# Patient Record
Sex: MALE | Race: BLACK OR AFRICAN AMERICAN | NOT HISPANIC OR LATINO | Employment: FULL TIME | URBAN - METROPOLITAN AREA
[De-identification: names, ages, dates, MRNs, and addresses within clinical notes are randomized per-mention and may not be internally consistent; named-entity substitution may affect disease eponyms.]

---

## 2022-01-01 ENCOUNTER — MYC MEDICAL ADVICE (OUTPATIENT)
Dept: CARDIOLOGY | Facility: CLINIC | Age: 47
End: 2022-01-01

## 2022-01-01 ENCOUNTER — LAB (OUTPATIENT)
Dept: LAB | Facility: CLINIC | Age: 47
End: 2022-01-01
Payer: COMMERCIAL

## 2022-01-01 ENCOUNTER — APPOINTMENT (OUTPATIENT)
Dept: CARDIOLOGY | Facility: CLINIC | Age: 47
DRG: 280 | End: 2022-01-01
Attending: PHYSICIAN ASSISTANT
Payer: COMMERCIAL

## 2022-01-01 ENCOUNTER — TELEPHONE (OUTPATIENT)
Dept: CARDIOLOGY | Facility: CLINIC | Age: 47
End: 2022-01-01

## 2022-01-01 ENCOUNTER — APPOINTMENT (OUTPATIENT)
Dept: PHYSICAL THERAPY | Facility: CLINIC | Age: 47
DRG: 280 | End: 2022-01-01
Attending: HOSPITALIST
Payer: COMMERCIAL

## 2022-01-01 ENCOUNTER — HOSPITAL ENCOUNTER (INPATIENT)
Facility: CLINIC | Age: 47
LOS: 6 days | Discharge: ANOTHER HOSPITAL | DRG: 280 | End: 2022-10-27
Attending: EMERGENCY MEDICINE | Admitting: INTERNAL MEDICINE
Payer: COMMERCIAL

## 2022-01-01 ENCOUNTER — PATIENT OUTREACH (OUTPATIENT)
Dept: CARE COORDINATION | Facility: CLINIC | Age: 47
End: 2022-01-01

## 2022-01-01 ENCOUNTER — OFFICE VISIT (OUTPATIENT)
Dept: CARDIOLOGY | Facility: CLINIC | Age: 47
End: 2022-01-01
Payer: COMMERCIAL

## 2022-01-01 ENCOUNTER — APPOINTMENT (OUTPATIENT)
Dept: CARDIOLOGY | Facility: CLINIC | Age: 47
DRG: 280 | End: 2022-01-01
Attending: INTERNAL MEDICINE
Payer: COMMERCIAL

## 2022-01-01 ENCOUNTER — OFFICE VISIT (OUTPATIENT)
Dept: CARDIOLOGY | Facility: CLINIC | Age: 47
End: 2022-01-01
Attending: NURSE PRACTITIONER
Payer: COMMERCIAL

## 2022-01-01 ENCOUNTER — TELEPHONE (OUTPATIENT)
Dept: INTERNAL MEDICINE | Facility: CLINIC | Age: 47
End: 2022-01-01

## 2022-01-01 ENCOUNTER — APPOINTMENT (OUTPATIENT)
Dept: PHYSICAL THERAPY | Facility: CLINIC | Age: 47
DRG: 280 | End: 2022-01-01
Attending: PHYSICIAN ASSISTANT
Payer: COMMERCIAL

## 2022-01-01 ENCOUNTER — HOSPITAL ENCOUNTER (INPATIENT)
Facility: CLINIC | Age: 47
LOS: 3 days | Discharge: HOME | DRG: 280 | End: 2022-10-30
Attending: HOSPITALIST | Admitting: INTERNAL MEDICINE
Payer: COMMERCIAL

## 2022-01-01 ENCOUNTER — APPOINTMENT (OUTPATIENT)
Dept: GENERAL RADIOLOGY | Facility: CLINIC | Age: 47
DRG: 280 | End: 2022-01-01
Attending: EMERGENCY MEDICINE
Payer: COMMERCIAL

## 2022-01-01 ENCOUNTER — APPOINTMENT (OUTPATIENT)
Dept: CARDIOLOGY | Facility: CLINIC | Age: 47
DRG: 280 | End: 2022-01-01
Attending: HOSPITALIST
Payer: COMMERCIAL

## 2022-01-01 VITALS
HEIGHT: 75 IN | HEART RATE: 86 BPM | SYSTOLIC BLOOD PRESSURE: 107 MMHG | DIASTOLIC BLOOD PRESSURE: 73 MMHG | BODY MASS INDEX: 22.29 KG/M2 | OXYGEN SATURATION: 99 % | WEIGHT: 179.3 LBS

## 2022-01-01 VITALS
HEART RATE: 84 BPM | SYSTOLIC BLOOD PRESSURE: 104 MMHG | DIASTOLIC BLOOD PRESSURE: 88 MMHG | RESPIRATION RATE: 16 BRPM | BODY MASS INDEX: 19.96 KG/M2 | WEIGHT: 159.7 LBS | OXYGEN SATURATION: 99 % | TEMPERATURE: 98.5 F

## 2022-01-01 VITALS
OXYGEN SATURATION: 99 % | HEIGHT: 75 IN | HEART RATE: 74 BPM | BODY MASS INDEX: 22.23 KG/M2 | SYSTOLIC BLOOD PRESSURE: 106 MMHG | DIASTOLIC BLOOD PRESSURE: 68 MMHG | WEIGHT: 178.8 LBS

## 2022-01-01 VITALS
OXYGEN SATURATION: 95 % | SYSTOLIC BLOOD PRESSURE: 110 MMHG | DIASTOLIC BLOOD PRESSURE: 89 MMHG | BODY MASS INDEX: 21.34 KG/M2 | TEMPERATURE: 97.7 F | HEART RATE: 81 BPM | HEIGHT: 75 IN | WEIGHT: 171.6 LBS | RESPIRATION RATE: 18 BRPM

## 2022-01-01 DIAGNOSIS — I50.21 ACUTE SYSTOLIC HEART FAILURE (H): ICD-10-CM

## 2022-01-01 DIAGNOSIS — I42.0 DILATED CARDIOMYOPATHY (H): Primary | ICD-10-CM

## 2022-01-01 DIAGNOSIS — I21.4 NSTEMI (NON-ST ELEVATED MYOCARDIAL INFARCTION) (H): ICD-10-CM

## 2022-01-01 DIAGNOSIS — I50.22 CHRONIC HFREF (HEART FAILURE WITH REDUCED EJECTION FRACTION) (H): Primary | ICD-10-CM

## 2022-01-01 DIAGNOSIS — I42.9 CARDIOMYOPATHY (H): Primary | ICD-10-CM

## 2022-01-01 DIAGNOSIS — I47.29 NSVT (NONSUSTAINED VENTRICULAR TACHYCARDIA) (H): ICD-10-CM

## 2022-01-01 DIAGNOSIS — R06.02 SHORTNESS OF BREATH: ICD-10-CM

## 2022-01-01 DIAGNOSIS — I42.9 CARDIOMYOPATHY (H): ICD-10-CM

## 2022-01-01 DIAGNOSIS — R00.2 PALPITATIONS: ICD-10-CM

## 2022-01-01 DIAGNOSIS — I50.22 CHRONIC HFREF (HEART FAILURE WITH REDUCED EJECTION FRACTION) (H): ICD-10-CM

## 2022-01-01 DIAGNOSIS — I51.3 LV (LEFT VENTRICULAR) MURAL THROMBUS: ICD-10-CM

## 2022-01-01 DIAGNOSIS — I42.0 DILATED CARDIOMYOPATHY (H): ICD-10-CM

## 2022-01-01 DIAGNOSIS — I51.3 RV (RIGHT VENTRICULAR) MURAL THROMBUS: ICD-10-CM

## 2022-01-01 DIAGNOSIS — I42.9 CARDIOMYOPATHY, UNSPECIFIED TYPE (H): Primary | ICD-10-CM

## 2022-01-01 DIAGNOSIS — I34.0 MITRAL VALVE INSUFFICIENCY, UNSPECIFIED ETIOLOGY: ICD-10-CM

## 2022-01-01 LAB
ALBUMIN SERPL-MCNC: 2.6 G/DL (ref 3.4–5)
ALP SERPL-CCNC: 88 U/L (ref 40–150)
ALT SERPL W P-5'-P-CCNC: 66 U/L (ref 0–70)
ANION GAP SERPL CALCULATED.3IONS-SCNC: 10 MMOL/L (ref 7–15)
ANION GAP SERPL CALCULATED.3IONS-SCNC: 10 MMOL/L (ref 7–15)
ANION GAP SERPL CALCULATED.3IONS-SCNC: 11 MMOL/L (ref 7–15)
ANION GAP SERPL CALCULATED.3IONS-SCNC: 13 MMOL/L (ref 7–15)
ANION GAP SERPL CALCULATED.3IONS-SCNC: 5 MMOL/L (ref 3–14)
ANION GAP SERPL CALCULATED.3IONS-SCNC: 8 MMOL/L (ref 7–15)
ANION GAP SERPL CALCULATED.3IONS-SCNC: 9 MMOL/L (ref 3–14)
ANION GAP SERPL CALCULATED.3IONS-SCNC: 9 MMOL/L (ref 7–15)
APTT PPP: 30 SECONDS (ref 22–38)
AST SERPL W P-5'-P-CCNC: 33 U/L (ref 0–45)
ATRIAL RATE - MUSE: 150 BPM
ATRIAL RATE - MUSE: 77 BPM
ATRIAL RATE - MUSE: 82 BPM
ATRIAL RATE - MUSE: 85 BPM
ATRIAL RATE - MUSE: 96 BPM
ATRIAL RATE - MUSE: 97 BPM
BASOPHILS # BLD AUTO: 0.1 10E3/UL (ref 0–0.2)
BASOPHILS NFR BLD AUTO: 1 %
BILIRUB SERPL-MCNC: 1 MG/DL (ref 0.2–1.3)
BUN SERPL-MCNC: 10.8 MG/DL (ref 6–20)
BUN SERPL-MCNC: 13.6 MG/DL (ref 6–20)
BUN SERPL-MCNC: 14.3 MG/DL (ref 6–20)
BUN SERPL-MCNC: 16.6 MG/DL (ref 6–20)
BUN SERPL-MCNC: 17.2 MG/DL (ref 6–20)
BUN SERPL-MCNC: 18 MG/DL (ref 7–30)
BUN SERPL-MCNC: 18 MG/DL (ref 7–30)
BUN SERPL-MCNC: 20.5 MG/DL (ref 6–20)
CALCIUM SERPL-MCNC: 8.6 MG/DL (ref 8.6–10)
CALCIUM SERPL-MCNC: 8.7 MG/DL (ref 8.5–10.1)
CALCIUM SERPL-MCNC: 8.7 MG/DL (ref 8.6–10)
CALCIUM SERPL-MCNC: 8.7 MG/DL (ref 8.6–10)
CALCIUM SERPL-MCNC: 8.9 MG/DL (ref 8.5–10.1)
CALCIUM SERPL-MCNC: 8.9 MG/DL (ref 8.6–10)
CALCIUM SERPL-MCNC: 9.2 MG/DL (ref 8.6–10)
CALCIUM SERPL-MCNC: 9.2 MG/DL (ref 8.6–10)
CHLORIDE BLD-SCNC: 106 MMOL/L (ref 94–109)
CHLORIDE BLD-SCNC: 107 MMOL/L (ref 94–109)
CHLORIDE SERPL-SCNC: 103 MMOL/L (ref 98–107)
CHLORIDE SERPL-SCNC: 104 MMOL/L (ref 98–107)
CHLORIDE SERPL-SCNC: 105 MMOL/L (ref 98–107)
CHLORIDE SERPL-SCNC: 106 MMOL/L (ref 98–107)
CHLORIDE SERPL-SCNC: 107 MMOL/L (ref 98–107)
CHLORIDE SERPL-SCNC: 108 MMOL/L (ref 98–107)
CHOLEST SERPL-MCNC: 126 MG/DL
CK SERPL-CCNC: 164 U/L (ref 39–308)
CO2 SERPL-SCNC: 23 MMOL/L (ref 20–32)
CO2 SERPL-SCNC: 26 MMOL/L (ref 20–32)
CREAT SERPL-MCNC: 1.18 MG/DL (ref 0.67–1.17)
CREAT SERPL-MCNC: 1.22 MG/DL (ref 0.67–1.17)
CREAT SERPL-MCNC: 1.23 MG/DL (ref 0.67–1.17)
CREAT SERPL-MCNC: 1.26 MG/DL (ref 0.66–1.25)
CREAT SERPL-MCNC: 1.26 MG/DL (ref 0.66–1.25)
CREAT SERPL-MCNC: 1.3 MG/DL (ref 0.66–1.25)
CREAT SERPL-MCNC: 1.31 MG/DL (ref 0.67–1.17)
CREAT SERPL-MCNC: 1.35 MG/DL (ref 0.67–1.17)
CREAT SERPL-MCNC: 1.36 MG/DL (ref 0.67–1.17)
D DIMER PPP FEU-MCNC: 0.56 UG/ML FEU (ref 0–0.5)
DEPRECATED HCO3 PLAS-SCNC: 23 MMOL/L (ref 22–29)
DEPRECATED HCO3 PLAS-SCNC: 23 MMOL/L (ref 22–29)
DEPRECATED HCO3 PLAS-SCNC: 24 MMOL/L (ref 22–29)
DIASTOLIC BLOOD PRESSURE - MUSE: NORMAL MMHG
EOSINOPHIL # BLD AUTO: 0.1 10E3/UL (ref 0–0.7)
EOSINOPHIL NFR BLD AUTO: 2 %
ERYTHROCYTE [DISTWIDTH] IN BLOOD BY AUTOMATED COUNT: 13.7 % (ref 10–15)
ERYTHROCYTE [DISTWIDTH] IN BLOOD BY AUTOMATED COUNT: 13.7 % (ref 10–15)
ERYTHROCYTE [DISTWIDTH] IN BLOOD BY AUTOMATED COUNT: 14.2 % (ref 10–15)
ERYTHROCYTE [DISTWIDTH] IN BLOOD BY AUTOMATED COUNT: 14.3 % (ref 10–15)
ERYTHROCYTE [DISTWIDTH] IN BLOOD BY AUTOMATED COUNT: 14.5 % (ref 10–15)
ERYTHROCYTE [DISTWIDTH] IN BLOOD BY AUTOMATED COUNT: 14.5 % (ref 10–15)
ERYTHROCYTE [DISTWIDTH] IN BLOOD BY AUTOMATED COUNT: 14.6 % (ref 10–15)
FERRITIN SERPL-MCNC: 22 NG/ML (ref 31–409)
FLUAV RNA SPEC QL NAA+PROBE: NEGATIVE
FLUBV RNA RESP QL NAA+PROBE: NEGATIVE
GFR SERPL CREATININE-BSD FRML MDRD: 64 ML/MIN/1.73M2
GFR SERPL CREATININE-BSD FRML MDRD: 64 ML/MIN/1.73M2
GFR SERPL CREATININE-BSD FRML MDRD: 67 ML/MIN/1.73M2
GFR SERPL CREATININE-BSD FRML MDRD: 67 ML/MIN/1.73M2
GFR SERPL CREATININE-BSD FRML MDRD: 70 ML/MIN/1.73M2
GFR SERPL CREATININE-BSD FRML MDRD: 70 ML/MIN/1.73M2
GFR SERPL CREATININE-BSD FRML MDRD: 72 ML/MIN/1.73M2
GFR SERPL CREATININE-BSD FRML MDRD: 73 ML/MIN/1.73M2
GFR SERPL CREATININE-BSD FRML MDRD: 76 ML/MIN/1.73M2
GLUCOSE BLD-MCNC: 83 MG/DL (ref 70–99)
GLUCOSE BLD-MCNC: 98 MG/DL (ref 70–99)
GLUCOSE BLDC GLUCOMTR-MCNC: 82 MG/DL (ref 70–99)
GLUCOSE BLDC GLUCOMTR-MCNC: 89 MG/DL (ref 70–99)
GLUCOSE BLDC GLUCOMTR-MCNC: 97 MG/DL (ref 70–99)
GLUCOSE SERPL-MCNC: 101 MG/DL (ref 70–99)
GLUCOSE SERPL-MCNC: 103 MG/DL (ref 70–99)
GLUCOSE SERPL-MCNC: 119 MG/DL (ref 70–99)
GLUCOSE SERPL-MCNC: 79 MG/DL (ref 70–99)
GLUCOSE SERPL-MCNC: 91 MG/DL (ref 70–99)
GLUCOSE SERPL-MCNC: 92 MG/DL (ref 70–99)
HBA1C MFR BLD: 5.5 %
HCT VFR BLD AUTO: 39.2 % (ref 40–53)
HCT VFR BLD AUTO: 40.4 % (ref 40–53)
HCT VFR BLD AUTO: 40.5 % (ref 40–53)
HCT VFR BLD AUTO: 41.1 % (ref 40–53)
HCT VFR BLD AUTO: 42.1 % (ref 40–53)
HCT VFR BLD AUTO: 42.4 % (ref 40–53)
HCT VFR BLD AUTO: 43.7 % (ref 40–53)
HDLC SERPL-MCNC: 31 MG/DL
HGB BLD-MCNC: 12.7 G/DL (ref 13.3–17.7)
HGB BLD-MCNC: 12.7 G/DL (ref 13.3–17.7)
HGB BLD-MCNC: 12.8 G/DL (ref 13.3–17.7)
HGB BLD-MCNC: 13.1 G/DL (ref 13.3–17.7)
HGB BLD-MCNC: 13.4 G/DL (ref 13.3–17.7)
HGB BLD-MCNC: 13.5 G/DL (ref 13.3–17.7)
HGB BLD-MCNC: 13.5 G/DL (ref 13.3–17.7)
HOLD SPECIMEN: NORMAL
IMM GRANULOCYTES # BLD: 0 10E3/UL
IMM GRANULOCYTES NFR BLD: 0 %
INR PPP: 1.16 (ref 0.85–1.15)
INTERPRETATION ECG - MUSE: NORMAL
LDLC SERPL CALC-MCNC: 77 MG/DL
LVEF ECHO: NORMAL
LYMPHOCYTES # BLD AUTO: 2 10E3/UL (ref 0.8–5.3)
LYMPHOCYTES NFR BLD AUTO: 37 %
MAGNESIUM SERPL-MCNC: 1.5 MG/DL (ref 1.7–2.3)
MAGNESIUM SERPL-MCNC: 1.6 MG/DL (ref 1.7–2.3)
MAGNESIUM SERPL-MCNC: 1.8 MG/DL (ref 1.7–2.3)
MAGNESIUM SERPL-MCNC: 1.8 MG/DL (ref 1.7–2.3)
MAGNESIUM SERPL-MCNC: 2.1 MG/DL (ref 1.6–2.3)
MCH RBC QN AUTO: 29.3 PG (ref 26.5–33)
MCH RBC QN AUTO: 29.7 PG (ref 26.5–33)
MCH RBC QN AUTO: 29.8 PG (ref 26.5–33)
MCH RBC QN AUTO: 29.8 PG (ref 26.5–33)
MCH RBC QN AUTO: 29.9 PG (ref 26.5–33)
MCHC RBC AUTO-ENTMCNC: 30.9 G/DL (ref 31.5–36.5)
MCHC RBC AUTO-ENTMCNC: 31.1 G/DL (ref 31.5–36.5)
MCHC RBC AUTO-ENTMCNC: 31.1 G/DL (ref 31.5–36.5)
MCHC RBC AUTO-ENTMCNC: 31.4 G/DL (ref 31.5–36.5)
MCHC RBC AUTO-ENTMCNC: 31.8 G/DL (ref 31.5–36.5)
MCHC RBC AUTO-ENTMCNC: 32.4 G/DL (ref 31.5–36.5)
MCHC RBC AUTO-ENTMCNC: 33.2 G/DL (ref 31.5–36.5)
MCV RBC AUTO: 90 FL (ref 78–100)
MCV RBC AUTO: 92 FL (ref 78–100)
MCV RBC AUTO: 93 FL (ref 78–100)
MCV RBC AUTO: 94 FL (ref 78–100)
MCV RBC AUTO: 95 FL (ref 78–100)
MCV RBC AUTO: 96 FL (ref 78–100)
MCV RBC AUTO: 97 FL (ref 78–100)
MONOCYTES # BLD AUTO: 0.6 10E3/UL (ref 0–1.3)
MONOCYTES NFR BLD AUTO: 11 %
NEUTROPHILS # BLD AUTO: 2.6 10E3/UL (ref 1.6–8.3)
NEUTROPHILS NFR BLD AUTO: 49 %
NONHDLC SERPL-MCNC: 95 MG/DL
NRBC # BLD AUTO: 0 10E3/UL
NRBC BLD AUTO-RTO: 0 /100
NT-PROBNP SERPL-MCNC: 2258 PG/ML (ref 0–450)
NT-PROBNP SERPL-MCNC: 2732 PG/ML (ref 0–450)
P AXIS - MUSE: 45 DEGREES
P AXIS - MUSE: 54 DEGREES
P AXIS - MUSE: 57 DEGREES
P AXIS - MUSE: 64 DEGREES
P AXIS - MUSE: 72 DEGREES
P AXIS - MUSE: NORMAL DEGREES
PHOSPHATE SERPL-MCNC: 3.5 MG/DL (ref 2.5–4.5)
PLATELET # BLD AUTO: 364 10E3/UL (ref 150–450)
PLATELET # BLD AUTO: 365 10E3/UL (ref 150–450)
PLATELET # BLD AUTO: 402 10E3/UL (ref 150–450)
PLATELET # BLD AUTO: 411 10E3/UL (ref 150–450)
PLATELET # BLD AUTO: 461 10E3/UL (ref 150–450)
PLATELET # BLD AUTO: 483 10E3/UL (ref 150–450)
PLATELET # BLD AUTO: 509 10E3/UL (ref 150–450)
POTASSIUM BLD-SCNC: 4.2 MMOL/L (ref 3.4–5.3)
POTASSIUM BLD-SCNC: 4.3 MMOL/L (ref 3.4–5.3)
POTASSIUM SERPL-SCNC: 3.8 MMOL/L (ref 3.4–5.3)
POTASSIUM SERPL-SCNC: 3.9 MMOL/L (ref 3.4–5.3)
POTASSIUM SERPL-SCNC: 3.9 MMOL/L (ref 3.4–5.3)
POTASSIUM SERPL-SCNC: 4 MMOL/L (ref 3.4–5.3)
POTASSIUM SERPL-SCNC: 4.1 MMOL/L (ref 3.4–5.3)
POTASSIUM SERPL-SCNC: 4.1 MMOL/L (ref 3.4–5.3)
POTASSIUM SERPL-SCNC: 4.5 MMOL/L (ref 3.4–5.3)
POTASSIUM SERPL-SCNC: 4.6 MMOL/L (ref 3.4–5.3)
POTASSIUM SERPL-SCNC: 4.8 MMOL/L (ref 3.4–5.3)
PR INTERVAL - MUSE: 152 MS
PR INTERVAL - MUSE: 154 MS
PR INTERVAL - MUSE: 154 MS
PR INTERVAL - MUSE: 158 MS
PR INTERVAL - MUSE: 160 MS
PR INTERVAL - MUSE: NORMAL MS
PROCALCITONIN SERPL IA-MCNC: 0.06 NG/ML
PROT SERPL-MCNC: 6.1 G/DL (ref 6.8–8.8)
QRS DURATION - MUSE: 86 MS
QRS DURATION - MUSE: 88 MS
QT - MUSE: 322 MS
QT - MUSE: 368 MS
QT - MUSE: 384 MS
QT - MUSE: 406 MS
QT - MUSE: 426 MS
QT - MUSE: 464 MS
QTC - MUSE: 467 MS
QTC - MUSE: 474 MS
QTC - MUSE: 485 MS
QTC - MUSE: 506 MS
QTC - MUSE: 510 MS
QTC - MUSE: 525 MS
R AXIS - MUSE: -56 DEGREES
R AXIS - MUSE: -68 DEGREES
R AXIS - MUSE: -69 DEGREES
R AXIS - MUSE: -70 DEGREES
R AXIS - MUSE: 260 DEGREES
R AXIS - MUSE: 270 DEGREES
RBC # BLD AUTO: 4.28 10E6/UL (ref 4.4–5.9)
RBC # BLD AUTO: 4.31 10E6/UL (ref 4.4–5.9)
RBC # BLD AUTO: 4.33 10E6/UL (ref 4.4–5.9)
RBC # BLD AUTO: 4.39 10E6/UL (ref 4.4–5.9)
RBC # BLD AUTO: 4.48 10E6/UL (ref 4.4–5.9)
RBC # BLD AUTO: 4.53 10E6/UL (ref 4.4–5.9)
RBC # BLD AUTO: 4.55 10E6/UL (ref 4.4–5.9)
RSV RNA SPEC NAA+PROBE: NEGATIVE
SARS-COV-2 RNA RESP QL NAA+PROBE: POSITIVE
SODIUM SERPL-SCNC: 137 MMOL/L (ref 133–144)
SODIUM SERPL-SCNC: 138 MMOL/L (ref 136–145)
SODIUM SERPL-SCNC: 139 MMOL/L (ref 133–144)
SODIUM SERPL-SCNC: 139 MMOL/L (ref 136–145)
SODIUM SERPL-SCNC: 139 MMOL/L (ref 136–145)
SODIUM SERPL-SCNC: 140 MMOL/L (ref 136–145)
SYSTOLIC BLOOD PRESSURE - MUSE: NORMAL MMHG
T AXIS - MUSE: 168 DEGREES
T AXIS - MUSE: 190 DEGREES
T AXIS - MUSE: 266 DEGREES
T AXIS - MUSE: 268 DEGREES
T AXIS - MUSE: 96 DEGREES
T AXIS - MUSE: 99 DEGREES
T4 FREE SERPL-MCNC: 0.95 NG/DL (ref 0.9–1.7)
TRIGL SERPL-MCNC: 89 MG/DL
TROPONIN T SERPL HS-MCNC: 122 NG/L
TROPONIN T SERPL HS-MCNC: 128 NG/L
TROPONIN T SERPL HS-MCNC: 142 NG/L
TROPONIN T SERPL HS-MCNC: 177 NG/L
TSH SERPL DL<=0.005 MIU/L-ACNC: 5.75 UIU/ML (ref 0.3–4.2)
UFH PPP CHRO-ACNC: 0.14 IU/ML
UFH PPP CHRO-ACNC: 0.15 IU/ML
UFH PPP CHRO-ACNC: 0.21 IU/ML
UFH PPP CHRO-ACNC: 0.25 IU/ML
UFH PPP CHRO-ACNC: 0.28 IU/ML
UFH PPP CHRO-ACNC: 0.29 IU/ML
UFH PPP CHRO-ACNC: 0.33 IU/ML
UFH PPP CHRO-ACNC: 0.33 IU/ML
UFH PPP CHRO-ACNC: 0.34 IU/ML
UFH PPP CHRO-ACNC: 0.35 IU/ML
UFH PPP CHRO-ACNC: 0.39 IU/ML
UFH PPP CHRO-ACNC: 0.4 IU/ML
UFH PPP CHRO-ACNC: 0.59 IU/ML
VENTRICULAR RATE- MUSE: 151 BPM
VENTRICULAR RATE- MUSE: 77 BPM
VENTRICULAR RATE- MUSE: 82 BPM
VENTRICULAR RATE- MUSE: 85 BPM
VENTRICULAR RATE- MUSE: 96 BPM
VENTRICULAR RATE- MUSE: 97 BPM
WBC # BLD AUTO: 3.8 10E3/UL (ref 4–11)
WBC # BLD AUTO: 3.9 10E3/UL (ref 4–11)
WBC # BLD AUTO: 4.2 10E3/UL (ref 4–11)
WBC # BLD AUTO: 4.5 10E3/UL (ref 4–11)
WBC # BLD AUTO: 5.3 10E3/UL (ref 4–11)
WBC # BLD AUTO: 5.3 10E3/UL (ref 4–11)
WBC # BLD AUTO: 5.4 10E3/UL (ref 4–11)

## 2022-01-01 PROCEDURE — 250N000011 HC RX IP 250 OP 636: Performed by: PHYSICIAN ASSISTANT

## 2022-01-01 PROCEDURE — 250N000011 HC RX IP 250 OP 636: Performed by: INTERNAL MEDICINE

## 2022-01-01 PROCEDURE — C1769 GUIDE WIRE: HCPCS | Performed by: INTERNAL MEDICINE

## 2022-01-01 PROCEDURE — 80048 BASIC METABOLIC PNL TOTAL CA: CPT | Performed by: EMERGENCY MEDICINE

## 2022-01-01 PROCEDURE — 85520 HEPARIN ASSAY: CPT | Performed by: INTERNAL MEDICINE

## 2022-01-01 PROCEDURE — 93270 REMOTE 30 DAY ECG REV/REPORT: CPT

## 2022-01-01 PROCEDURE — 36415 COLL VENOUS BLD VENIPUNCTURE: CPT | Performed by: INTERNAL MEDICINE

## 2022-01-01 PROCEDURE — 84484 ASSAY OF TROPONIN QUANT: CPT | Performed by: EMERGENCY MEDICINE

## 2022-01-01 PROCEDURE — 250N000009 HC RX 250: Performed by: INTERNAL MEDICINE

## 2022-01-01 PROCEDURE — 250N000013 HC RX MED GY IP 250 OP 250 PS 637: Performed by: PHYSICIAN ASSISTANT

## 2022-01-01 PROCEDURE — 83735 ASSAY OF MAGNESIUM: CPT | Performed by: INTERNAL MEDICINE

## 2022-01-01 PROCEDURE — 99233 SBSQ HOSP IP/OBS HIGH 50: CPT | Performed by: PHYSICIAN ASSISTANT

## 2022-01-01 PROCEDURE — 84439 ASSAY OF FREE THYROXINE: CPT | Performed by: NURSE PRACTITIONER

## 2022-01-01 PROCEDURE — 99232 SBSQ HOSP IP/OBS MODERATE 35: CPT | Performed by: NURSE PRACTITIONER

## 2022-01-01 PROCEDURE — 36415 COLL VENOUS BLD VENIPUNCTURE: CPT | Performed by: NURSE PRACTITIONER

## 2022-01-01 PROCEDURE — 84443 ASSAY THYROID STIM HORMONE: CPT | Performed by: NURSE PRACTITIONER

## 2022-01-01 PROCEDURE — 80053 COMPREHEN METABOLIC PANEL: CPT | Performed by: PHYSICIAN ASSISTANT

## 2022-01-01 PROCEDURE — 80048 BASIC METABOLIC PNL TOTAL CA: CPT | Performed by: HOSPITALIST

## 2022-01-01 PROCEDURE — 250N000013 HC RX MED GY IP 250 OP 250 PS 637: Performed by: INTERNAL MEDICINE

## 2022-01-01 PROCEDURE — 83036 HEMOGLOBIN GLYCOSYLATED A1C: CPT | Performed by: INTERNAL MEDICINE

## 2022-01-01 PROCEDURE — 210N000002 HC R&B HEART CARE

## 2022-01-01 PROCEDURE — 85027 COMPLETE CBC AUTOMATED: CPT | Performed by: PHYSICIAN ASSISTANT

## 2022-01-01 PROCEDURE — 120N000001 HC R&B MED SURG/OB

## 2022-01-01 PROCEDURE — 93005 ELECTROCARDIOGRAM TRACING: CPT

## 2022-01-01 PROCEDURE — 258N000003 HC RX IP 258 OP 636: Performed by: INTERNAL MEDICINE

## 2022-01-01 PROCEDURE — 99215 OFFICE O/P EST HI 40 MIN: CPT | Performed by: NURSE PRACTITIONER

## 2022-01-01 PROCEDURE — 85520 HEPARIN ASSAY: CPT | Performed by: HOSPITALIST

## 2022-01-01 PROCEDURE — 99223 1ST HOSP IP/OBS HIGH 75: CPT | Mod: AI | Performed by: INTERNAL MEDICINE

## 2022-01-01 PROCEDURE — 93454 CORONARY ARTERY ANGIO S&I: CPT | Mod: 26 | Performed by: INTERNAL MEDICINE

## 2022-01-01 PROCEDURE — 99152 MOD SED SAME PHYS/QHP 5/>YRS: CPT | Performed by: INTERNAL MEDICINE

## 2022-01-01 PROCEDURE — 99233 SBSQ HOSP IP/OBS HIGH 50: CPT | Performed by: INTERNAL MEDICINE

## 2022-01-01 PROCEDURE — 96376 TX/PRO/DX INJ SAME DRUG ADON: CPT

## 2022-01-01 PROCEDURE — 85027 COMPLETE CBC AUTOMATED: CPT | Performed by: INTERNAL MEDICINE

## 2022-01-01 PROCEDURE — 85610 PROTHROMBIN TIME: CPT | Performed by: EMERGENCY MEDICINE

## 2022-01-01 PROCEDURE — 80061 LIPID PANEL: CPT | Performed by: INTERNAL MEDICINE

## 2022-01-01 PROCEDURE — 84132 ASSAY OF SERUM POTASSIUM: CPT | Performed by: INTERNAL MEDICINE

## 2022-01-01 PROCEDURE — 99233 SBSQ HOSP IP/OBS HIGH 50: CPT | Mod: 25 | Performed by: INTERNAL MEDICINE

## 2022-01-01 PROCEDURE — 97110 THERAPEUTIC EXERCISES: CPT | Mod: GP

## 2022-01-01 PROCEDURE — 82565 ASSAY OF CREATININE: CPT | Performed by: PHYSICIAN ASSISTANT

## 2022-01-01 PROCEDURE — 93272 ECG/REVIEW INTERPRET ONLY: CPT | Performed by: INTERNAL MEDICINE

## 2022-01-01 PROCEDURE — 255N000002 HC RX 255 OP 636: Performed by: INTERNAL MEDICINE

## 2022-01-01 PROCEDURE — 36415 COLL VENOUS BLD VENIPUNCTURE: CPT | Performed by: EMERGENCY MEDICINE

## 2022-01-01 PROCEDURE — 85379 FIBRIN DEGRADATION QUANT: CPT | Performed by: INTERNAL MEDICINE

## 2022-01-01 PROCEDURE — 82040 ASSAY OF SERUM ALBUMIN: CPT | Performed by: PHYSICIAN ASSISTANT

## 2022-01-01 PROCEDURE — 87637 SARSCOV2&INF A&B&RSV AMP PRB: CPT | Performed by: EMERGENCY MEDICINE

## 2022-01-01 PROCEDURE — 80048 BASIC METABOLIC PNL TOTAL CA: CPT | Performed by: NURSE PRACTITIONER

## 2022-01-01 PROCEDURE — A9585 GADOBUTROL INJECTION: HCPCS | Performed by: INTERNAL MEDICINE

## 2022-01-01 PROCEDURE — 75561 CARDIAC MRI FOR MORPH W/DYE: CPT

## 2022-01-01 PROCEDURE — 36415 COLL VENOUS BLD VENIPUNCTURE: CPT | Performed by: HOSPITALIST

## 2022-01-01 PROCEDURE — 99214 OFFICE O/P EST MOD 30 MIN: CPT | Performed by: NURSE PRACTITIONER

## 2022-01-01 PROCEDURE — 93454 CORONARY ARTERY ANGIO S&I: CPT | Performed by: INTERNAL MEDICINE

## 2022-01-01 PROCEDURE — 99207 PR NO BILLABLE SERVICE THIS VISIT: CPT | Performed by: INTERNAL MEDICINE

## 2022-01-01 PROCEDURE — 99233 SBSQ HOSP IP/OBS HIGH 50: CPT | Performed by: HOSPITALIST

## 2022-01-01 PROCEDURE — 99232 SBSQ HOSP IP/OBS MODERATE 35: CPT | Mod: 24 | Performed by: PHYSICIAN ASSISTANT

## 2022-01-01 PROCEDURE — 99285 EMERGENCY DEPT VISIT HI MDM: CPT | Mod: 25

## 2022-01-01 PROCEDURE — 93010 ELECTROCARDIOGRAM REPORT: CPT | Performed by: INTERNAL MEDICINE

## 2022-01-01 PROCEDURE — 83880 ASSAY OF NATRIURETIC PEPTIDE: CPT | Performed by: NURSE PRACTITIONER

## 2022-01-01 PROCEDURE — 36415 COLL VENOUS BLD VENIPUNCTURE: CPT | Performed by: PHYSICIAN ASSISTANT

## 2022-01-01 PROCEDURE — 80048 BASIC METABOLIC PNL TOTAL CA: CPT | Performed by: INTERNAL MEDICINE

## 2022-01-01 PROCEDURE — 85025 COMPLETE CBC W/AUTO DIFF WBC: CPT | Performed by: EMERGENCY MEDICINE

## 2022-01-01 PROCEDURE — 250N000011 HC RX IP 250 OP 636: Performed by: EMERGENCY MEDICINE

## 2022-01-01 PROCEDURE — 82728 ASSAY OF FERRITIN: CPT | Performed by: NURSE PRACTITIONER

## 2022-01-01 PROCEDURE — 85027 COMPLETE CBC AUTOMATED: CPT | Performed by: EMERGENCY MEDICINE

## 2022-01-01 PROCEDURE — 97161 PT EVAL LOW COMPLEX 20 MIN: CPT | Mod: GP

## 2022-01-01 PROCEDURE — 82310 ASSAY OF CALCIUM: CPT | Performed by: INTERNAL MEDICINE

## 2022-01-01 PROCEDURE — 99207 PR SC NO CHARGE VISIT: CPT | Performed by: INTERNAL MEDICINE

## 2022-01-01 PROCEDURE — 99291 CRITICAL CARE FIRST HOUR: CPT | Performed by: INTERNAL MEDICINE

## 2022-01-01 PROCEDURE — 97530 THERAPEUTIC ACTIVITIES: CPT | Mod: GP

## 2022-01-01 PROCEDURE — 99232 SBSQ HOSP IP/OBS MODERATE 35: CPT | Performed by: PHYSICIAN ASSISTANT

## 2022-01-01 PROCEDURE — B2111ZZ FLUOROSCOPY OF MULTIPLE CORONARY ARTERIES USING LOW OSMOLAR CONTRAST: ICD-10-PCS | Performed by: INTERNAL MEDICINE

## 2022-01-01 PROCEDURE — 82550 ASSAY OF CK (CPK): CPT | Performed by: INTERNAL MEDICINE

## 2022-01-01 PROCEDURE — 250N000013 HC RX MED GY IP 250 OP 250 PS 637: Performed by: NURSE PRACTITIONER

## 2022-01-01 PROCEDURE — 84145 PROCALCITONIN (PCT): CPT | Performed by: EMERGENCY MEDICINE

## 2022-01-01 PROCEDURE — 83880 ASSAY OF NATRIURETIC PEPTIDE: CPT | Performed by: EMERGENCY MEDICINE

## 2022-01-01 PROCEDURE — 99232 SBSQ HOSP IP/OBS MODERATE 35: CPT | Performed by: INTERNAL MEDICINE

## 2022-01-01 PROCEDURE — 85027 COMPLETE CBC AUTOMATED: CPT | Performed by: HOSPITALIST

## 2022-01-01 PROCEDURE — 99239 HOSP IP/OBS DSCHRG MGMT >30: CPT | Performed by: HOSPITALIST

## 2022-01-01 PROCEDURE — 93306 TTE W/DOPPLER COMPLETE: CPT

## 2022-01-01 PROCEDURE — 83735 ASSAY OF MAGNESIUM: CPT | Performed by: PHYSICIAN ASSISTANT

## 2022-01-01 PROCEDURE — 93306 TTE W/DOPPLER COMPLETE: CPT | Mod: 26 | Performed by: INTERNAL MEDICINE

## 2022-01-01 PROCEDURE — 272N000001 HC OR GENERAL SUPPLY STERILE: Performed by: INTERNAL MEDICINE

## 2022-01-01 PROCEDURE — 84484 ASSAY OF TROPONIN QUANT: CPT | Performed by: INTERNAL MEDICINE

## 2022-01-01 PROCEDURE — 84100 ASSAY OF PHOSPHORUS: CPT | Performed by: PHYSICIAN ASSISTANT

## 2022-01-01 PROCEDURE — 96365 THER/PROPH/DIAG IV INF INIT: CPT

## 2022-01-01 PROCEDURE — 85730 THROMBOPLASTIN TIME PARTIAL: CPT | Performed by: EMERGENCY MEDICINE

## 2022-01-01 PROCEDURE — 99223 1ST HOSP IP/OBS HIGH 75: CPT | Mod: GT | Performed by: INTERNAL MEDICINE

## 2022-01-01 PROCEDURE — C9803 HOPD COVID-19 SPEC COLLECT: HCPCS

## 2022-01-01 PROCEDURE — 99207 PR APP CREDIT; MD BILLING SHARED VISIT: CPT | Performed by: PHYSICIAN ASSISTANT

## 2022-01-01 PROCEDURE — 75561 CARDIAC MRI FOR MORPH W/DYE: CPT | Mod: 26 | Performed by: INTERNAL MEDICINE

## 2022-01-01 PROCEDURE — 71046 X-RAY EXAM CHEST 2 VIEWS: CPT

## 2022-01-01 PROCEDURE — 99417 PROLNG OP E/M EACH 15 MIN: CPT | Performed by: NURSE PRACTITIONER

## 2022-01-01 PROCEDURE — 96366 THER/PROPH/DIAG IV INF ADDON: CPT

## 2022-01-01 PROCEDURE — C1894 INTRO/SHEATH, NON-LASER: HCPCS | Performed by: INTERNAL MEDICINE

## 2022-01-01 PROCEDURE — 99239 HOSP IP/OBS DSCHRG MGMT >30: CPT | Performed by: INTERNAL MEDICINE

## 2022-01-01 PROCEDURE — C1887 CATHETER, GUIDING: HCPCS | Performed by: INTERNAL MEDICINE

## 2022-01-01 RX ORDER — ASPIRIN 325 MG
325 TABLET ORAL ONCE
Status: COMPLETED | OUTPATIENT
Start: 2022-01-01 | End: 2022-01-01

## 2022-01-01 RX ORDER — LIDOCAINE 40 MG/G
CREAM TOPICAL
Status: DISCONTINUED | OUTPATIENT
Start: 2022-01-01 | End: 2022-01-01

## 2022-01-01 RX ORDER — ONDANSETRON 2 MG/ML
4 INJECTION INTRAMUSCULAR; INTRAVENOUS EVERY 6 HOURS PRN
Status: DISCONTINUED | OUTPATIENT
Start: 2022-01-01 | End: 2022-01-01 | Stop reason: HOSPADM

## 2022-01-01 RX ORDER — HEPARIN SODIUM 10000 [USP'U]/100ML
0-5000 INJECTION, SOLUTION INTRAVENOUS CONTINUOUS
Status: DISCONTINUED | OUTPATIENT
Start: 2022-01-01 | End: 2022-01-01

## 2022-01-01 RX ORDER — NALOXONE HYDROCHLORIDE 0.4 MG/ML
0.2 INJECTION, SOLUTION INTRAMUSCULAR; INTRAVENOUS; SUBCUTANEOUS
Status: DISCONTINUED | OUTPATIENT
Start: 2022-01-01 | End: 2022-01-01 | Stop reason: HOSPADM

## 2022-01-01 RX ORDER — SODIUM CHLORIDE 9 MG/ML
INJECTION, SOLUTION INTRAVENOUS CONTINUOUS
Status: DISCONTINUED | OUTPATIENT
Start: 2022-01-01 | End: 2022-01-01 | Stop reason: HOSPADM

## 2022-01-01 RX ORDER — METOPROLOL TARTRATE 25 MG/1
25 TABLET, FILM COATED ORAL 2 TIMES DAILY
Status: DISCONTINUED | OUTPATIENT
Start: 2022-01-01 | End: 2022-01-01

## 2022-01-01 RX ORDER — ATROPINE SULFATE 0.1 MG/ML
0.5 INJECTION INTRAVENOUS
Status: DISCONTINUED | OUTPATIENT
Start: 2022-01-01 | End: 2022-01-01 | Stop reason: HOSPADM

## 2022-01-01 RX ORDER — HYDROMORPHONE HCL IN WATER/PF 6 MG/30 ML
0.2 PATIENT CONTROLLED ANALGESIA SYRINGE INTRAVENOUS
Status: DISCONTINUED | OUTPATIENT
Start: 2022-01-01 | End: 2022-01-01 | Stop reason: HOSPADM

## 2022-01-01 RX ORDER — ACETAMINOPHEN 325 MG/1
650 TABLET ORAL EVERY 4 HOURS PRN
Status: DISCONTINUED | OUTPATIENT
Start: 2022-01-01 | End: 2022-01-01 | Stop reason: HOSPADM

## 2022-01-01 RX ORDER — NALOXONE HYDROCHLORIDE 0.4 MG/ML
0.4 INJECTION, SOLUTION INTRAMUSCULAR; INTRAVENOUS; SUBCUTANEOUS
Status: DISCONTINUED | OUTPATIENT
Start: 2022-01-01 | End: 2022-01-01

## 2022-01-01 RX ORDER — NITROGLYCERIN 5 MG/ML
VIAL (ML) INTRAVENOUS
Status: DISCONTINUED
Start: 2022-01-01 | End: 2022-01-01 | Stop reason: HOSPADM

## 2022-01-01 RX ORDER — SACUBITRIL AND VALSARTAN 49; 51 MG/1; MG/1
1 TABLET, FILM COATED ORAL 2 TIMES DAILY
Qty: 60 TABLET | Refills: 1 | Status: SHIPPED | OUTPATIENT
Start: 2022-01-01

## 2022-01-01 RX ORDER — LORAZEPAM 0.5 MG/1
0.5 TABLET ORAL
Status: DISCONTINUED | OUTPATIENT
Start: 2022-01-01 | End: 2022-01-01 | Stop reason: HOSPADM

## 2022-01-01 RX ORDER — METOPROLOL SUCCINATE 50 MG/1
50 TABLET, EXTENDED RELEASE ORAL 2 TIMES DAILY
Qty: 180 TABLET | Refills: 1 | Status: SHIPPED | OUTPATIENT
Start: 2022-01-01

## 2022-01-01 RX ORDER — OXYCODONE HYDROCHLORIDE 5 MG/1
5 TABLET ORAL EVERY 4 HOURS PRN
Status: DISCONTINUED | OUTPATIENT
Start: 2022-01-01 | End: 2022-01-01 | Stop reason: HOSPADM

## 2022-01-01 RX ORDER — FENTANYL CITRATE 50 UG/ML
INJECTION, SOLUTION INTRAMUSCULAR; INTRAVENOUS
Status: DISCONTINUED
Start: 2022-01-01 | End: 2022-01-01 | Stop reason: HOSPADM

## 2022-01-01 RX ORDER — NITROGLYCERIN 5 MG/ML
VIAL (ML) INTRAVENOUS
Status: COMPLETED | OUTPATIENT
Start: 2022-01-01 | End: 2022-01-01

## 2022-01-01 RX ORDER — GADOBUTROL 604.72 MG/ML
10 INJECTION INTRAVENOUS ONCE
Status: COMPLETED | OUTPATIENT
Start: 2022-01-01 | End: 2022-01-01

## 2022-01-01 RX ORDER — ACETAMINOPHEN 325 MG/1
650 TABLET ORAL EVERY 6 HOURS PRN
Status: DISCONTINUED | OUTPATIENT
Start: 2022-01-01 | End: 2022-01-01 | Stop reason: HOSPADM

## 2022-01-01 RX ORDER — ACETAMINOPHEN 650 MG/1
650 SUPPOSITORY RECTAL EVERY 6 HOURS PRN
Status: DISCONTINUED | OUTPATIENT
Start: 2022-01-01 | End: 2022-01-01 | Stop reason: HOSPADM

## 2022-01-01 RX ORDER — SODIUM CHLORIDE 9 MG/ML
75 INJECTION, SOLUTION INTRAVENOUS CONTINUOUS
Status: DISCONTINUED | OUTPATIENT
Start: 2022-01-01 | End: 2022-01-01 | Stop reason: HOSPADM

## 2022-01-01 RX ORDER — BISACODYL 10 MG
10 SUPPOSITORY, RECTAL RECTAL DAILY PRN
Status: DISCONTINUED | OUTPATIENT
Start: 2022-01-01 | End: 2022-01-01 | Stop reason: HOSPADM

## 2022-01-01 RX ORDER — METOPROLOL SUCCINATE 25 MG/1
25 TABLET, EXTENDED RELEASE ORAL 2 TIMES DAILY
Status: DISCONTINUED | OUTPATIENT
Start: 2022-01-01 | End: 2022-01-01 | Stop reason: HOSPADM

## 2022-01-01 RX ORDER — NALOXONE HYDROCHLORIDE 0.4 MG/ML
0.4 INJECTION, SOLUTION INTRAMUSCULAR; INTRAVENOUS; SUBCUTANEOUS
Status: DISCONTINUED | OUTPATIENT
Start: 2022-01-01 | End: 2022-01-01 | Stop reason: HOSPADM

## 2022-01-01 RX ORDER — ONDANSETRON 4 MG/1
4 TABLET, ORALLY DISINTEGRATING ORAL EVERY 6 HOURS PRN
Status: DISCONTINUED | OUTPATIENT
Start: 2022-01-01 | End: 2022-01-01

## 2022-01-01 RX ORDER — AMOXICILLIN 250 MG
2 CAPSULE ORAL 2 TIMES DAILY PRN
Status: DISCONTINUED | OUTPATIENT
Start: 2022-01-01 | End: 2022-01-01 | Stop reason: HOSPADM

## 2022-01-01 RX ORDER — NITROGLYCERIN 0.4 MG/1
0.4 TABLET SUBLINGUAL EVERY 5 MIN PRN
Status: DISCONTINUED | OUTPATIENT
Start: 2022-01-01 | End: 2022-01-01

## 2022-01-01 RX ORDER — ASPIRIN 81 MG/1
81 TABLET ORAL DAILY
Status: DISCONTINUED | OUTPATIENT
Start: 2022-01-01 | End: 2022-01-01 | Stop reason: HOSPADM

## 2022-01-01 RX ORDER — METOPROLOL SUCCINATE 25 MG/1
25 TABLET, EXTENDED RELEASE ORAL 2 TIMES DAILY
Qty: 60 TABLET | Refills: 0 | Status: SHIPPED | OUTPATIENT
Start: 2022-01-01 | End: 2022-01-01

## 2022-01-01 RX ORDER — PROCHLORPERAZINE MALEATE 5 MG
10 TABLET ORAL EVERY 6 HOURS PRN
Status: DISCONTINUED | OUTPATIENT
Start: 2022-01-01 | End: 2022-01-01 | Stop reason: HOSPADM

## 2022-01-01 RX ORDER — LABETALOL HYDROCHLORIDE 5 MG/ML
10 INJECTION, SOLUTION INTRAVENOUS
Status: DISCONTINUED | OUTPATIENT
Start: 2022-01-01 | End: 2022-01-01 | Stop reason: HOSPADM

## 2022-01-01 RX ORDER — VERAPAMIL HYDROCHLORIDE 2.5 MG/ML
INJECTION, SOLUTION INTRAVENOUS
Status: COMPLETED | OUTPATIENT
Start: 2022-01-01 | End: 2022-01-01

## 2022-01-01 RX ORDER — LIDOCAINE HYDROCHLORIDE 10 MG/ML
INJECTION, SOLUTION EPIDURAL; INFILTRATION; INTRACAUDAL; PERINEURAL
Status: DISCONTINUED
Start: 2022-01-01 | End: 2022-01-01 | Stop reason: HOSPADM

## 2022-01-01 RX ORDER — PROCHLORPERAZINE 25 MG
25 SUPPOSITORY, RECTAL RECTAL EVERY 12 HOURS PRN
Status: DISCONTINUED | OUTPATIENT
Start: 2022-01-01 | End: 2022-01-01 | Stop reason: HOSPADM

## 2022-01-01 RX ORDER — METOPROLOL TARTRATE 1 MG/ML
2.5 INJECTION, SOLUTION INTRAVENOUS EVERY 4 HOURS PRN
Status: DISCONTINUED | OUTPATIENT
Start: 2022-01-01 | End: 2022-01-01 | Stop reason: HOSPADM

## 2022-01-01 RX ORDER — ASPIRIN 81 MG/1
81 TABLET ORAL DAILY
Status: DISCONTINUED | OUTPATIENT
Start: 2022-01-01 | End: 2022-01-01

## 2022-01-01 RX ORDER — FENTANYL CITRATE 50 UG/ML
INJECTION, SOLUTION INTRAMUSCULAR; INTRAVENOUS
Status: COMPLETED | OUTPATIENT
Start: 2022-01-01 | End: 2022-01-01

## 2022-01-01 RX ORDER — ASPIRIN 81 MG/1
243 TABLET, CHEWABLE ORAL ONCE
Status: COMPLETED | OUTPATIENT
Start: 2022-01-01 | End: 2022-01-01

## 2022-01-01 RX ORDER — AMOXICILLIN 250 MG
1 CAPSULE ORAL 2 TIMES DAILY PRN
Status: DISCONTINUED | OUTPATIENT
Start: 2022-01-01 | End: 2022-01-01 | Stop reason: HOSPADM

## 2022-01-01 RX ORDER — LORAZEPAM 2 MG/ML
0.5 INJECTION INTRAMUSCULAR
Status: DISCONTINUED | OUTPATIENT
Start: 2022-01-01 | End: 2022-01-01 | Stop reason: HOSPADM

## 2022-01-01 RX ORDER — VERAPAMIL HYDROCHLORIDE 2.5 MG/ML
INJECTION, SOLUTION INTRAVENOUS
Status: DISCONTINUED
Start: 2022-01-01 | End: 2022-01-01 | Stop reason: HOSPADM

## 2022-01-01 RX ORDER — FLUMAZENIL 0.1 MG/ML
0.2 INJECTION, SOLUTION INTRAVENOUS
Status: DISCONTINUED | OUTPATIENT
Start: 2022-01-01 | End: 2022-01-01 | Stop reason: HOSPADM

## 2022-01-01 RX ORDER — ACETAMINOPHEN 650 MG/1
650 SUPPOSITORY RECTAL EVERY 6 HOURS PRN
Status: DISCONTINUED | OUTPATIENT
Start: 2022-01-01 | End: 2022-01-01

## 2022-01-01 RX ORDER — AMOXICILLIN 250 MG
1 CAPSULE ORAL 2 TIMES DAILY
Status: DISCONTINUED | OUTPATIENT
Start: 2022-01-01 | End: 2022-01-01 | Stop reason: HOSPADM

## 2022-01-01 RX ORDER — CODEINE PHOSPHATE AND GUAIFENESIN 10; 100 MG/5ML; MG/5ML
10 SOLUTION ORAL ONCE
Status: COMPLETED | OUTPATIENT
Start: 2022-01-01 | End: 2022-01-01

## 2022-01-01 RX ORDER — HYDRALAZINE HYDROCHLORIDE 20 MG/ML
10 INJECTION INTRAMUSCULAR; INTRAVENOUS EVERY 4 HOURS PRN
Status: DISCONTINUED | OUTPATIENT
Start: 2022-01-01 | End: 2022-01-01 | Stop reason: HOSPADM

## 2022-01-01 RX ORDER — HEPARIN SODIUM 1000 [USP'U]/ML
INJECTION, SOLUTION INTRAVENOUS; SUBCUTANEOUS
Status: COMPLETED | OUTPATIENT
Start: 2022-01-01 | End: 2022-01-01

## 2022-01-01 RX ORDER — ACETAMINOPHEN 325 MG/1
650 TABLET ORAL EVERY 6 HOURS PRN
Status: DISCONTINUED | OUTPATIENT
Start: 2022-01-01 | End: 2022-01-01

## 2022-01-01 RX ORDER — HEPARIN SODIUM 1000 [USP'U]/ML
INJECTION, SOLUTION INTRAVENOUS; SUBCUTANEOUS
Status: DISCONTINUED
Start: 2022-01-01 | End: 2022-01-01 | Stop reason: HOSPADM

## 2022-01-01 RX ORDER — NITROGLYCERIN 0.4 MG/1
0.4 TABLET SUBLINGUAL EVERY 5 MIN PRN
Status: DISCONTINUED | OUTPATIENT
Start: 2022-01-01 | End: 2022-01-01 | Stop reason: HOSPADM

## 2022-01-01 RX ORDER — ONDANSETRON 4 MG/1
4 TABLET, ORALLY DISINTEGRATING ORAL EVERY 6 HOURS PRN
Status: DISCONTINUED | OUTPATIENT
Start: 2022-01-01 | End: 2022-01-01 | Stop reason: HOSPADM

## 2022-01-01 RX ORDER — LIDOCAINE 40 MG/G
CREAM TOPICAL
Status: DISCONTINUED | OUTPATIENT
Start: 2022-01-01 | End: 2022-01-01 | Stop reason: HOSPADM

## 2022-01-01 RX ORDER — HEPARIN SODIUM 10000 [USP'U]/100ML
0-5000 INJECTION, SOLUTION INTRAVENOUS CONTINUOUS
Status: DISCONTINUED | OUTPATIENT
Start: 2022-01-01 | End: 2022-01-01 | Stop reason: HOSPADM

## 2022-01-01 RX ORDER — OXYCODONE HYDROCHLORIDE 10 MG/1
10 TABLET ORAL EVERY 4 HOURS PRN
Status: DISCONTINUED | OUTPATIENT
Start: 2022-01-01 | End: 2022-01-01 | Stop reason: HOSPADM

## 2022-01-01 RX ORDER — NALOXONE HYDROCHLORIDE 0.4 MG/ML
0.2 INJECTION, SOLUTION INTRAMUSCULAR; INTRAVENOUS; SUBCUTANEOUS
Status: DISCONTINUED | OUTPATIENT
Start: 2022-01-01 | End: 2022-01-01

## 2022-01-01 RX ORDER — POTASSIUM CHLORIDE 1500 MG/1
20 TABLET, EXTENDED RELEASE ORAL
Status: DISCONTINUED | OUTPATIENT
Start: 2022-01-01 | End: 2022-01-01 | Stop reason: HOSPADM

## 2022-01-01 RX ORDER — METOPROLOL SUCCINATE 25 MG/1
TABLET, EXTENDED RELEASE ORAL
Qty: 180 TABLET | Refills: 3 | Status: SHIPPED | OUTPATIENT
Start: 2022-01-01 | End: 2022-01-01

## 2022-01-01 RX ORDER — ONDANSETRON 2 MG/ML
4 INJECTION INTRAMUSCULAR; INTRAVENOUS EVERY 6 HOURS PRN
Status: DISCONTINUED | OUTPATIENT
Start: 2022-01-01 | End: 2022-01-01

## 2022-01-01 RX ORDER — IOPAMIDOL 755 MG/ML
INJECTION, SOLUTION INTRAVASCULAR
Status: COMPLETED | OUTPATIENT
Start: 2022-01-01 | End: 2022-01-01

## 2022-01-01 RX ORDER — MAGNESIUM SULFATE HEPTAHYDRATE 40 MG/ML
2 INJECTION, SOLUTION INTRAVENOUS ONCE
Status: COMPLETED | OUTPATIENT
Start: 2022-01-01 | End: 2022-01-01

## 2022-01-01 RX ORDER — MAGNESIUM OXIDE 400 MG/1
400 TABLET ORAL EVERY 4 HOURS
Status: COMPLETED | OUTPATIENT
Start: 2022-01-01 | End: 2022-01-01

## 2022-01-01 RX ORDER — HYDROMORPHONE HYDROCHLORIDE 1 MG/ML
0.3 INJECTION, SOLUTION INTRAMUSCULAR; INTRAVENOUS; SUBCUTANEOUS
Status: DISCONTINUED | OUTPATIENT
Start: 2022-01-01 | End: 2022-01-01 | Stop reason: HOSPADM

## 2022-01-01 RX ORDER — FENTANYL CITRATE 50 UG/ML
25 INJECTION, SOLUTION INTRAMUSCULAR; INTRAVENOUS
Status: DISCONTINUED | OUTPATIENT
Start: 2022-01-01 | End: 2022-01-01 | Stop reason: HOSPADM

## 2022-01-01 RX ORDER — AMOXICILLIN 250 MG
2 CAPSULE ORAL 2 TIMES DAILY
Status: DISCONTINUED | OUTPATIENT
Start: 2022-01-01 | End: 2022-01-01 | Stop reason: HOSPADM

## 2022-01-01 RX ADMIN — HEPARIN SODIUM 1260 UNITS/HR: 10000 INJECTION, SOLUTION INTRAVENOUS at 04:11

## 2022-01-01 RX ADMIN — METOPROLOL TARTRATE 37.5 MG: 25 TABLET, FILM COATED ORAL at 08:11

## 2022-01-01 RX ADMIN — NITROGLYCERIN 0.4 MG: 0.4 TABLET SUBLINGUAL at 04:10

## 2022-01-01 RX ADMIN — MAGNESIUM SULFATE HEPTAHYDRATE 2 G: 40 INJECTION, SOLUTION INTRAVENOUS at 05:53

## 2022-01-01 RX ADMIN — SODIUM CHLORIDE: 9 INJECTION, SOLUTION INTRAVENOUS at 13:15

## 2022-01-01 RX ADMIN — HEPARIN SODIUM 1400 UNITS/HR: 10000 INJECTION, SOLUTION INTRAVENOUS at 20:29

## 2022-01-01 RX ADMIN — SACUBITRIL AND VALSARTAN 1 TABLET: 24; 26 TABLET, FILM COATED ORAL at 21:10

## 2022-01-01 RX ADMIN — METOPROLOL SUCCINATE 25 MG: 25 TABLET, EXTENDED RELEASE ORAL at 09:45

## 2022-01-01 RX ADMIN — METOPROLOL TARTRATE 37.5 MG: 25 TABLET, FILM COATED ORAL at 09:07

## 2022-01-01 RX ADMIN — LOSARTAN POTASSIUM 12.5 MG: 25 TABLET, FILM COATED ORAL at 10:08

## 2022-01-01 RX ADMIN — METOPROLOL TARTRATE 25 MG: 25 TABLET, FILM COATED ORAL at 22:49

## 2022-01-01 RX ADMIN — HEPARIN SODIUM 1550 UNITS/HR: 10000 INJECTION, SOLUTION INTRAVENOUS at 09:15

## 2022-01-01 RX ADMIN — METOPROLOL SUCCINATE 25 MG: 25 TABLET, EXTENDED RELEASE ORAL at 21:44

## 2022-01-01 RX ADMIN — APIXABAN 10 MG: 5 TABLET, FILM COATED ORAL at 09:34

## 2022-01-01 RX ADMIN — METOPROLOL SUCCINATE 25 MG: 25 TABLET, EXTENDED RELEASE ORAL at 09:34

## 2022-01-01 RX ADMIN — SACUBITRIL AND VALSARTAN 1 TABLET: 24; 26 TABLET, FILM COATED ORAL at 21:34

## 2022-01-01 RX ADMIN — Medication 400 MG: at 23:41

## 2022-01-01 RX ADMIN — METOPROLOL TARTRATE 37.5 MG: 25 TABLET, FILM COATED ORAL at 22:04

## 2022-01-01 RX ADMIN — METOPROLOL SUCCINATE 25 MG: 25 TABLET, EXTENDED RELEASE ORAL at 10:08

## 2022-01-01 RX ADMIN — LOSARTAN POTASSIUM 12.5 MG: 25 TABLET, FILM COATED ORAL at 09:45

## 2022-01-01 RX ADMIN — ASPIRIN 81 MG: 81 TABLET, COATED ORAL at 09:07

## 2022-01-01 RX ADMIN — EMPAGLIFLOZIN 10 MG: 10 TABLET, FILM COATED ORAL at 09:34

## 2022-01-01 RX ADMIN — GADOBUTROL 10 ML: 604.72 INJECTION INTRAVENOUS at 14:35

## 2022-01-01 RX ADMIN — METOPROLOL TARTRATE 25 MG: 25 TABLET, FILM COATED ORAL at 08:52

## 2022-01-01 RX ADMIN — EMPAGLIFLOZIN 10 MG: 10 TABLET, FILM COATED ORAL at 15:20

## 2022-01-01 RX ADMIN — SACUBITRIL AND VALSARTAN 1 TABLET: 24; 26 TABLET, FILM COATED ORAL at 10:20

## 2022-01-01 RX ADMIN — APIXABAN 10 MG: 5 TABLET, FILM COATED ORAL at 21:34

## 2022-01-01 RX ADMIN — NITROGLYCERIN 0.4 MG: 0.4 TABLET SUBLINGUAL at 00:27

## 2022-01-01 RX ADMIN — METOPROLOL TARTRATE 37.5 MG: 25 TABLET, FILM COATED ORAL at 21:59

## 2022-01-01 RX ADMIN — NITROGLYCERIN 0.4 MG: 0.4 TABLET SUBLINGUAL at 21:25

## 2022-01-01 RX ADMIN — APIXABAN 10 MG: 5 TABLET, FILM COATED ORAL at 10:20

## 2022-01-01 RX ADMIN — METOPROLOL TARTRATE 25 MG: 25 TABLET, FILM COATED ORAL at 08:15

## 2022-01-01 RX ADMIN — LOSARTAN POTASSIUM 12.5 MG: 25 TABLET, FILM COATED ORAL at 10:15

## 2022-01-01 RX ADMIN — EMPAGLIFLOZIN 10 MG: 10 TABLET, FILM COATED ORAL at 10:20

## 2022-01-01 RX ADMIN — METOPROLOL SUCCINATE 25 MG: 25 TABLET, EXTENDED RELEASE ORAL at 21:34

## 2022-01-01 RX ADMIN — GUAIFENESIN AND CODEINE PHOSPHATE 10 ML: 10; 100 LIQUID ORAL at 19:02

## 2022-01-01 RX ADMIN — METOPROLOL TARTRATE 25 MG: 25 TABLET, FILM COATED ORAL at 21:05

## 2022-01-01 RX ADMIN — ASPIRIN 81 MG: 81 TABLET, COATED ORAL at 08:15

## 2022-01-01 RX ADMIN — NITROGLYCERIN 0.4 MG: 0.4 TABLET SUBLINGUAL at 21:20

## 2022-01-01 RX ADMIN — METOPROLOL SUCCINATE 25 MG: 25 TABLET, EXTENDED RELEASE ORAL at 20:39

## 2022-01-01 RX ADMIN — SACUBITRIL AND VALSARTAN 1 TABLET: 24; 26 TABLET, FILM COATED ORAL at 09:34

## 2022-01-01 RX ADMIN — METOPROLOL SUCCINATE 25 MG: 25 TABLET, EXTENDED RELEASE ORAL at 10:20

## 2022-01-01 RX ADMIN — METOPROLOL TARTRATE 2.5 MG: 5 INJECTION INTRAVENOUS at 16:46

## 2022-01-01 RX ADMIN — ASPIRIN 81 MG: 81 TABLET, COATED ORAL at 08:52

## 2022-01-01 RX ADMIN — ASPIRIN 81 MG CHEWABLE TABLET 243 MG: 81 TABLET CHEWABLE at 13:10

## 2022-01-01 RX ADMIN — HEPARIN SODIUM 1260 UNITS/HR: 10000 INJECTION, SOLUTION INTRAVENOUS at 00:47

## 2022-01-01 RX ADMIN — ASPIRIN 81 MG: 81 TABLET, COATED ORAL at 08:11

## 2022-01-01 RX ADMIN — LOSARTAN POTASSIUM 12.5 MG: 25 TABLET, FILM COATED ORAL at 13:28

## 2022-01-01 RX ADMIN — MAGNESIUM SULFATE HEPTAHYDRATE 2 G: 40 INJECTION, SOLUTION INTRAVENOUS at 09:46

## 2022-01-01 RX ADMIN — HEPARIN SODIUM 1410 UNITS/HR: 10000 INJECTION, SOLUTION INTRAVENOUS at 09:42

## 2022-01-01 RX ADMIN — ASPIRIN 81 MG: 81 TABLET, COATED ORAL at 10:08

## 2022-01-01 RX ADMIN — HEPARIN SODIUM 960 UNITS/HR: 10000 INJECTION, SOLUTION INTRAVENOUS at 17:59

## 2022-01-01 RX ADMIN — APIXABAN 10 MG: 5 TABLET, FILM COATED ORAL at 21:09

## 2022-01-01 RX ADMIN — ASPIRIN 81 MG: 81 TABLET, COATED ORAL at 10:15

## 2022-01-01 RX ADMIN — HEPARIN SODIUM 1410 UNITS/HR: 10000 INJECTION, SOLUTION INTRAVENOUS at 04:16

## 2022-01-01 RX ADMIN — SODIUM CHLORIDE 75 ML/HR: 9 INJECTION, SOLUTION INTRAVENOUS at 16:36

## 2022-01-01 RX ADMIN — METOPROLOL SUCCINATE 25 MG: 25 TABLET, EXTENDED RELEASE ORAL at 10:15

## 2022-01-01 RX ADMIN — ASPIRIN 81 MG: 81 TABLET, COATED ORAL at 09:45

## 2022-01-01 RX ADMIN — HEPARIN SODIUM 1410 UNITS/HR: 10000 INJECTION, SOLUTION INTRAVENOUS at 13:31

## 2022-01-01 RX ADMIN — APIXABAN 10 MG: 5 TABLET, FILM COATED ORAL at 15:20

## 2022-01-01 RX ADMIN — HEPARIN SODIUM 1110 UNITS/HR: 10000 INJECTION, SOLUTION INTRAVENOUS at 00:39

## 2022-01-01 RX ADMIN — Medication 400 MG: at 20:29

## 2022-01-01 RX ADMIN — HEPARIN SODIUM 1110 UNITS/HR: 10000 INJECTION, SOLUTION INTRAVENOUS at 10:37

## 2022-01-01 RX ADMIN — HEPARIN SODIUM 1410 UNITS/HR: 10000 INJECTION, SOLUTION INTRAVENOUS at 18:41

## 2022-01-01 RX ADMIN — METOPROLOL SUCCINATE 25 MG: 25 TABLET, EXTENDED RELEASE ORAL at 20:30

## 2022-01-01 ASSESSMENT — ACTIVITIES OF DAILY LIVING (ADL)
ADLS_ACUITY_SCORE: 18
DIFFICULTY_EATING/SWALLOWING: NO
ADLS_ACUITY_SCORE: 19
ADLS_ACUITY_SCORE: 19
ADLS_ACUITY_SCORE: 18
DOING_ERRANDS_INDEPENDENTLY_DIFFICULTY: NO
ADLS_ACUITY_SCORE: 18
CONCENTRATING,_REMEMBERING_OR_MAKING_DECISIONS_DIFFICULTY: NO
ADLS_ACUITY_SCORE: 19
DRESSING/BATHING_DIFFICULTY: NO
ADLS_ACUITY_SCORE: 18
ADLS_ACUITY_SCORE: 18
ADLS_ACUITY_SCORE: 35
ADLS_ACUITY_SCORE: 19
WALKING_OR_CLIMBING_STAIRS_DIFFICULTY: NO
ADLS_ACUITY_SCORE: 19
ADLS_ACUITY_SCORE: 19
ADLS_ACUITY_SCORE: 18
DRESSING/BATHING_DIFFICULTY: NO
FALL_HISTORY_WITHIN_LAST_SIX_MONTHS: NO
ADLS_ACUITY_SCORE: 18
DOING_ERRANDS_INDEPENDENTLY_DIFFICULTY: NO
ADLS_ACUITY_SCORE: 18
TOILETING_ISSUES: NO
ADLS_ACUITY_SCORE: 19
ADLS_ACUITY_SCORE: 35
ADLS_ACUITY_SCORE: 18
CHANGE_IN_FUNCTIONAL_STATUS_SINCE_ONSET_OF_CURRENT_ILLNESS/INJURY: NO
ADLS_ACUITY_SCORE: 18
ADLS_ACUITY_SCORE: 35
ADLS_ACUITY_SCORE: 18
WALKING_OR_CLIMBING_STAIRS_DIFFICULTY: NO
ADLS_ACUITY_SCORE: 19
ADLS_ACUITY_SCORE: 18
ADLS_ACUITY_SCORE: 19
ADLS_ACUITY_SCORE: 18
ADLS_ACUITY_SCORE: 19
ADLS_ACUITY_SCORE: 18
ADLS_ACUITY_SCORE: 19
ADLS_ACUITY_SCORE: 18
ADLS_ACUITY_SCORE: 19
ADLS_ACUITY_SCORE: 33
ADLS_ACUITY_SCORE: 19
ADLS_ACUITY_SCORE: 18
ADLS_ACUITY_SCORE: 19
WEAR_GLASSES_OR_BLIND: NO
ADLS_ACUITY_SCORE: 19
ADLS_ACUITY_SCORE: 18
DIFFICULTY_COMMUNICATING: NO
ADLS_ACUITY_SCORE: 18
ADLS_ACUITY_SCORE: 19
WEAR_GLASSES_OR_BLIND: NO
ADLS_ACUITY_SCORE: 19
ADLS_ACUITY_SCORE: 19
ADLS_ACUITY_SCORE: 18
ADLS_ACUITY_SCORE: 18
FALL_HISTORY_WITHIN_LAST_SIX_MONTHS: NO
ADLS_ACUITY_SCORE: 19
ADLS_ACUITY_SCORE: 19
ADLS_ACUITY_SCORE: 35
DIFFICULTY_EATING/SWALLOWING: NO
ADLS_ACUITY_SCORE: 19
ADLS_ACUITY_SCORE: 18
ADLS_ACUITY_SCORE: 19
ADLS_ACUITY_SCORE: 19
ADLS_ACUITY_SCORE: 18
ADLS_ACUITY_SCORE: 19
ADLS_ACUITY_SCORE: 18
ADLS_ACUITY_SCORE: 19
ADLS_ACUITY_SCORE: 19
ADLS_ACUITY_SCORE: 18
ADLS_ACUITY_SCORE: 19
CONCENTRATING,_REMEMBERING_OR_MAKING_DECISIONS_DIFFICULTY: NO
ADLS_ACUITY_SCORE: 18
ADLS_ACUITY_SCORE: 18
ADLS_ACUITY_SCORE: 19
ADLS_ACUITY_SCORE: 18
ADLS_ACUITY_SCORE: 18
TOILETING_ISSUES: NO
ADLS_ACUITY_SCORE: 19
ADLS_ACUITY_SCORE: 18
CHANGE_IN_FUNCTIONAL_STATUS_SINCE_ONSET_OF_CURRENT_ILLNESS/INJURY: NO
ADLS_ACUITY_SCORE: 19
ADLS_ACUITY_SCORE: 19
ADLS_ACUITY_SCORE: 18
ADLS_ACUITY_SCORE: 19
ADLS_ACUITY_SCORE: 18
ADLS_ACUITY_SCORE: 35
ADLS_ACUITY_SCORE: 18
HEARING_DIFFICULTY_OR_DEAF: NO
ADLS_ACUITY_SCORE: 19
ADLS_ACUITY_SCORE: 18

## 2022-01-01 ASSESSMENT — ENCOUNTER SYMPTOMS: SHORTNESS OF BREATH: 0

## 2022-10-21 PROBLEM — I21.4 NSTEMI (NON-ST ELEVATED MYOCARDIAL INFARCTION) (H): Status: ACTIVE | Noted: 2022-01-01

## 2022-10-21 PROBLEM — R06.02 SHORTNESS OF BREATH: Status: ACTIVE | Noted: 2022-01-01

## 2022-10-21 PROBLEM — I21.3 ACUTE ST ELEVATION MYOCARDIAL INFARCTION (STEMI) (H): Status: ACTIVE | Noted: 2022-01-01

## 2022-10-21 NOTE — ED NOTES
[facility]  ED Nurse Handoff Report    Tom Rosario is a 47 year old male   ED Chief complaint: Shortness of Breath  . ED Diagnosis:   Final diagnoses:   Shortness of breath   NSTEMI (non-ST elevated myocardial infarction) (H)     Allergies: No Known Allergies    Code Status: Full Code  Activity level - Baseline/Home:  Independent. Activity Level - Current:   Stand by Assist. Lift room needed: No. Bariatric: No   Needed: No   Isolation: No. Infection: Not Applicable.     Vital Signs:   Vitals:    10/21/22 1431   BP: (!) 124/105   Pulse: 101   Resp: 18   Temp: 97.6  F (36.4  C)   TempSrc: Oral   SpO2: 99%       Cardiac Rhythm:  ,      Pain level:    Patient confused: No. Patient Falls Risk: Yes.   Elimination Status: Has voided   Patient Report - Initial Complaint: Chest pain, SOB. Focused Assessment: Pt here for shortness of breath, pt was dx with pneumonia on the 11th and has completed both antibiotics with no relief. Pt advises his shortness of breath keeps him up at night. Pt also endorses midsternum chest soreness with coughing   Tests Performed: labs, imaging. Abnormal Results:   Labs Ordered and Resulted from Time of ED Arrival to Time of ED Departure   BASIC METABOLIC PANEL - Abnormal       Result Value    Sodium 140      Potassium 4.8      Chloride 107      Carbon Dioxide (CO2) 24      Anion Gap 9      Urea Nitrogen 20.5 (*)     Creatinine 1.18 (*)     Calcium 8.9      Glucose 91      GFR Estimate 76     TROPONIN T, HIGH SENSITIVITY - Abnormal    Troponin T, High Sensitivity 177 (*)    CBC WITH PLATELETS AND DIFFERENTIAL - Abnormal    WBC Count 5.4      RBC Count 4.53      Hemoglobin 13.5      Hematocrit 43.7      MCV 97      MCH 29.8      MCHC 30.9 (*)     RDW 14.5      Platelet Count 509 (*)     % Neutrophils 49      % Lymphocytes 37      % Monocytes 11      % Eosinophils 2      % Basophils 1      % Immature Granulocytes 0      NRBCs per 100 WBC 0      Absolute Neutrophils 2.6       Absolute Lymphocytes 2.0      Absolute Monocytes 0.6      Absolute Eosinophils 0.1      Absolute Basophils 0.1      Absolute Immature Granulocytes 0.0      Absolute NRBCs 0.0     NT PROBNP INPATIENT - Abnormal    N terminal Pro BNP Inpatient 2,732 (*)    PROCALCITONIN - Abnormal    Procalcitonin 0.06 (*)    INFLUENZA A/B & SARS-COV2 PCR MULTIPLEX   INR   PARTIAL THROMBOPLASTIN TIME   CBC WITH PLATELETS     Chest XR,  PA & LAT   Final Result   IMPRESSION: PA and lateral views of the chest were obtained.   Cardiomediastinal silhouette is within normal limits. Mild right   perihilar predominant pulmonary opacities, indeterminate, could   represent infection or atelectasis. No significant pleural effusion or   pneumothorax.      ALIREZA PENN MD            SYSTEM ID:  [id number]        .   Treatments provided: see MAR  Family Comments: N/A  OBS brochure/video discussed/provided to patient:  No  ED Medications:   Medications   heparin loading dose for LOW INTENSITY TREATMENT * Give BEFORE starting heparin infusion (has no administration in time range)   heparin 25,000 units in 0.45% NaCl 250 mL ANTICOAGULANT infusion (has no administration in time range)     Drips infusing:  No  For the majority of the shift, the patient's behavior Green. Interventions performed were N/A.    Sepsis treatment initiated: No     Patient tested for COVID 19 prior to admission: YES    ED Nurse Name/Phone Number: Taylor Austin RN,   5:32 PM    RECEIVING UNIT ED HANDOFF REVIEW    Above ED Nurse Handoff Report was reviewed: Yes  Reviewed by: Laury Romero RN on October 21, 2022 at 8:52 PM

## 2022-10-21 NOTE — ED PROVIDER NOTES
History   Chief Complaint:  Shortness of Breath       The history is provided by the patient.      Tom Rosario is a 47 year old male who presents with shortness of breath. On 10/11/22, patient was diagnosed with pneumonia and completed a course of antibiotics. At 2200 last night, Patient reports experiencing shortness of breath that has not resolved. At 0900 today, patient reports experiencing chest pain.       Review of Systems   Respiratory: Negative for shortness of breath.    All other systems reviewed and are negative.    Allergies:  The patient has no known allergies.     Medications:  The patient is currently on no regular medications.    Past Medical History:     The patient denies past medical history including .      Social History:  The patient presents to the ED via private vehicle.  The patient presents to the ED alone.    Physical Exam     Patient Vitals for the past 24 hrs:   BP Temp Temp src Pulse Resp SpO2   10/21/22 1431 (!) 124/105 97.6  F (36.4  C) Oral 101 18 99 %       Physical Exam  Vitals reviewed.   HENT:      Head: Normocephalic.   Eyes:      Pupils: Pupils are equal, round, and reactive to light.   Cardiovascular:      Rate and Rhythm: Normal rate and regular rhythm.   Pulmonary:      Effort: Pulmonary effort is normal.   Abdominal:      General: Bowel sounds are normal.      Palpations: Abdomen is soft.   Musculoskeletal:         General: Normal range of motion.      Cervical back: Normal range of motion.   Skin:     General: Skin is warm.      Capillary Refill: Capillary refill takes less than 2 seconds.   Neurological:      General: No focal deficit present.      Mental Status: He is alert and oriented to person, place, and time.   Psychiatric:         Mood and Affect: Mood normal.           Emergency Department Course   ECG  ECG results from 10/21/22   EKG 12-lead, tracing only     Value    Systolic Blood Pressure     Diastolic Blood Pressure     Ventricular Rate 97    Atrial Rate  97    DC Interval 154    QRS Duration 86        QTc 467    P Axis 64    R AXIS -70    T Axis 99    Interpretation ECG      Sinus rhythm  Left atrial enlargement  Left axis deviation  ST & T wave abnormality, consider lateral ischemia  Prolonged QT  Abnormal ECG       Imaging:  Chest XR,  PA & LAT   Final Result   IMPRESSION: PA and lateral views of the chest were obtained.   Cardiomediastinal silhouette is within normal limits. Mild right   perihilar predominant pulmonary opacities, indeterminate, could   represent infection or atelectasis. No significant pleural effusion or   pneumothorax.      ALIREZA PENN MD            SYSTEM ID:  [id number]        Report per radiology    Laboratory:  Labs Ordered and Resulted from Time of ED Arrival to Time of ED Departure   BASIC METABOLIC PANEL - Abnormal       Result Value    Sodium 140      Potassium 4.8      Chloride 107      Carbon Dioxide (CO2) 24      Anion Gap 9      Urea Nitrogen 20.5 (*)     Creatinine 1.18 (*)     Calcium 8.9      Glucose 91      GFR Estimate 76     TROPONIN T, HIGH SENSITIVITY - Abnormal    Troponin T, High Sensitivity 177 (*)    CBC WITH PLATELETS AND DIFFERENTIAL - Abnormal    WBC Count 5.4      RBC Count 4.53      Hemoglobin 13.5      Hematocrit 43.7      MCV 97      MCH 29.8      MCHC 30.9 (*)     RDW 14.5      Platelet Count 509 (*)     % Neutrophils 49      % Lymphocytes 37      % Monocytes 11      % Eosinophils 2      % Basophils 1      % Immature Granulocytes 0      NRBCs per 100 WBC 0      Absolute Neutrophils 2.6      Absolute Lymphocytes 2.0      Absolute Monocytes 0.6      Absolute Eosinophils 0.1      Absolute Basophils 0.1      Absolute Immature Granulocytes 0.0      Absolute NRBCs 0.0     NT PROBNP INPATIENT - Abnormal    N terminal Pro BNP Inpatient 2,732 (*)    PROCALCITONIN - Abnormal    Procalcitonin 0.06 (*)    INFLUENZA A/B & SARS-COV2 PCR MULTIPLEX      Emergency Department Course:     Reviewed:  I reviewed nursing  notes, vitals, past medical history and Care Everywhere    Assessments:  1520 I obtained history and examined the patient as noted above.   1800 I rechecked the patient and explained findings.     Consults:  hospitalist    Interventions:      Disposition:  The patient was admitted to the hospital under the care of hospitalist    Impression & Plan     Medical Decision Making:  Patient is a 47-year-old male with a chief complaint of shortness of breath he has had some vague chest tightness and shortness of breath and shortness of breath with exertion recently seen and evaluated and thought to have pneumonia though no clear fever patient has had a cough.  Repeat chest x-ray does still demonstrate some perihilar inflammation already evaluated for VTE.  However troponin and N-terminal BNP are both elevated.  Differential diagnosis includes cardiomyopathy versus non-ST segment elevation MI recommended admission for echo and further work-up due to cardiac abnormal lab test and dyspnea on exertion without clear etiology.  Care was discussed with the hospitalist initially admitted on observation pending results of further work-up.    Diagnosis:    ICD-10-CM    1. Shortness of breath  R06.02       2. NSTEMI (non-ST elevated myocardial infarction) (H)  I21.4           Discharge Medications:  New Prescriptions    No medications on file       Scribe Disclosure:  I, Clarisa Rogers, am serving as a scribe at 4:20 PM on 10/21/2022 to document services personally performed by Rowdy Hightower MD based on my observations and the provider's statements to me.            Rowdy Hightower MD  10/26/22 1124

## 2022-10-21 NOTE — Clinical Note
The DP pulses are 2+ bilaterally. The PT pulses are 1+ bilaterally. The right radial pulse is 2+. The right ulnar pulse is 1+.

## 2022-10-21 NOTE — H&P
[facility]  Hospitalist Admission Note  Name: Tom Rosario    MRN: [id number]  YOB: 1975    Age: 47 year old  Date of admission: 10/21/2022  Primary care provider: No Ref-Primary, Physician    Chief Complaint: Dyspnea    Tom Rosario is a 47 year old male who does not generally see primary care who presents with ongoing dyspnea and chest discomfort 10 days after being treated for pneumonia from urgent care.    He was evaluated about 10 days ago due to shortness of breath.  He ultimately had a CT PE study done which was negative for clot but showed patchy bilateral infiltrates with a central distribution, right greater than left.  This was felt to be most likely atypical infection though pulmonary edema was mentioned as well as a possibility.  He was treated with amoxicillin and azithromycin.  He contacted clinic today due to worsening shortness of breath which accelerated last night.  He gets short of breath walking and was actually short of breath at rest today.  He does report he has had some chest heaviness and discomfort associated with his shortness of breath He was directed to the ER for further evaluation.    Here in the ER he was mildly tachycardic to 101 but otherwise hemodynamically stable.  Blood pressure was 124/105 and oxygen saturation was 99%.  CBC was grossly normal as was a BMP.  EKG showed LVH by voltage and lateral ST inversions.    At this point I am concerned that his dyspnea is an anginal equivalent and he is being admitted for initiation of heparin drip, echocardiogram and cardiology consultation.    Assessment and Plan:   1. Dyspnea: Highest clinical concern is for cardiogenic cause, and specifically an anginal equivalent.  He was treated with a course of antibiotics without improvement and has not had classic infectious symptoms.  He now presents with an elevated troponin, elevated BNP and nonspecific EKG changes.  He does describe some chest heaviness and  pressure and his shortness of breath has accelerated to the point where it is often present at rest.  --Admit under inpatient status  -- Continue heparin drip, check an echocardiogram and repeat troponins  -- Etiology consult  --ASA 81 mg daily, start metoprolol 25 mg twice daily, nitroglycerin as needed  --Check hemoglobin A1c and lipid panel, consider statin  -- We will watch off antibiotics for now, procalcitonin low    2.   Elevated troponin with nonspecific EKG changes concerning for non-ST elevation MI: In addition to his dyspnea he does describe some chest discomfort which has been off and on.  -- Treating for non-ST elevation MI above with heparin drip, beta-blocker, aspirin and cardiology consultation for consideration of a coronary angiogram.    3.   Elevated BNP: Check an echocardiogram.  Certainly suggest a cardiac component to his presentation.    4.   Concern for left ventricular hypertrophy based on EKG: Check echocardiogram, monitor blood pressure.      DVT Prophylaxis: Heparin drip  Code Status: Full Code  Discharge Dispo: Admit under inpatient status      History of Present Illness:  Tom Rosario is a 47 year old male who does not generally see primary care who presents with ongoing dyspnea and chest discomfort 10 days after being treated for pneumonia from urgent care.    He was evaluated about 10 days ago due to shortness of breath.  He ultimately had a CT PE study done which was negative for clot but showed patchy bilateral infiltrates with a central distribution, right greater than left.  This was felt to be most likely atypical infection though pulmonary edema was mentioned as well.  He was treated with amoxicillin and azithromycin.  He contacted clinic today due to worsening shortness of breath which started last night.  He gets short of breath walking and was actually short of breath at rest today.  He does describe some wheezing as well.  He was directed to the ER for further  evaluation.    Here in the ER he was mildly tachycardic to 101 but otherwise hemodynamically stable.  Blood pressure was 124/105 and oxygen saturation was 99%.  CBC was grossly normal as was a BMP.  EKG showed LVH by voltage and lateral ST inversions.     Past Medical History:  He denies any known medical history.    Past Surgical History:  He denies any surgeries    Social History: Works a relatively active job for [employer].  Generally does not drink alcohol.  Used to smoke cigars, about 2/day at his peak for 10 years.  He did inhale.  Current non-smoker.    Family History:  He believes his mother  of cancer and possibly heart related issues during that time.  He thinks he has multiple family members with diabetes.  He is not aware of anyone else that had known coronary disease.    Allergies:  No Known Allergies    Medications:  Not take any daily medications.  Review of Systems:  A Comprehensive greater than 10 system review of systems was carried out.  Pertinent positives and negatives are noted above.  Otherwise negative for contributory information.     Physical Exam:  Blood pressure (!) 124/105, pulse 101, temperature 97.6  F (36.4  C), temperature source Oral, resp. rate 18, SpO2 99 %.  Wt Readings from Last 1 Encounters:   No data found for Wt     Exam:  General: Alert, awake, no acute distress.  HEENT: NC/AT, eyes anicteric, external occular movements intact, face symmetric.  Dentition WNL, MM moist.  Cardiac: RRR, S1, S2.  No murmurs appreciated.  Pulmonary: Normal chest rise, normal work of breathing.  Lungs CTA BL  Abdomen: soft, non-tender, non-distended.  Bowel Sounds Present.  No guarding.  Extremities: no deformities.  Warm, well perfused.  Skin: no rashes or lesions noted.  Warm and Dry.  Neuro: No focal deficits noted.  Speech clear.  Coordination and strength grossly normal.  Psych: Appropriate affect.    Data:  EKG:  EKG showed LVH by voltage and lateral ST inversions.  Imaging:  Results  for orders placed or performed during the hospital encounter of 10/21/22   Chest XR,  PA & LAT    Narrative    CHEST TWO VIEWS    10/21/2022 3:53 PM     HISTORY: Evaluate for worsening pneumonia.    COMPARISON: None available      Impression    IMPRESSION: PA and lateral views of the chest were obtained.  Cardiomediastinal silhouette is within normal limits. Mild right  perihilar predominant pulmonary opacities, indeterminate, could  represent infection or atelectasis. No significant pleural effusion or  pneumothorax.    ALIREZA PENN MD         SYSTEM ID:  [id number]     Labs:  Recent Labs   Lab 10/21/22  1524   WBC 5.4   HGB 13.5   HCT 43.7   MCV 97   *          Lab Results   Component Value Date     10/21/2022    Lab Results   Component Value Date    CHLORIDE 107 10/21/2022    Lab Results   Component Value Date    BUN 20.5 10/21/2022      Lab Results   Component Value Date    POTASSIUM 4.8 10/21/2022    Lab Results   Component Value Date    CO2 24 10/21/2022    Lab Results   Component Value Date    CR 1.18 10/21/2022        Recent Labs   Lab 10/21/22  1524   NTBNPI 2,732*     No results for input(s): TROPONIN, TROPI, TROPR, TROPONINIS in the last 168 hours.    Invalid input(s): TROPT, TROP, TROPONINIES, TNIH      Chapo Ha MD  Hospitalist  [facility]

## 2022-10-21 NOTE — PHARMACY-ADMISSION MEDICATION HISTORY
Admission medication history interview status for this patient is complete. See [facility] admission navigator for allergy information, prior to admission medications and immunization status.     Medication history interview done, indicate source(s): Patient and Family  Medication history resources (including written lists, pill bottles, clinic record):None  Pharmacy: -    Changes made to PTA medication list:  Added: -  Changed: -  Reported as Not Taking: -  Removed: -    Actions taken by pharmacist (provider contacted, etc):None     Additional medication history information:None    Medication reconciliation/reorder completed by provider prior to medication history?  no   (Y/N)     For patients on insulin therapy:   Do you use sliding scale insulin based on blood sugars?   What is your pre-meal insulin coverage?    Do you typically eat three meals a day?   How many times do you check your blood glucose per day?   How many episodes of hypoglycemia do you typically have per month?   Do you have a Continuous Glucose Monitor (CGM)?      Prior to Admission medications    Not on File

## 2022-10-21 NOTE — ED TRIAGE NOTES
Pt here for shortness of breath, pt was dx with pneumonia on the 11th and has completed both antibiotics with no relief. Pt advises his shortness of breath keeps him up at night. Pt also endorses midsternum chest soreness with coughing     Triage Assessment     Row Name 10/21/22 9719       Triage Assessment (Adult)    Airway WDL WDL       Respiratory WDL    Respiratory WDL X;rhythm/pattern    Rhythm/Pattern, Respiratory shortness of breath       Skin Circulation/Temperature WDL    Skin Circulation/Temperature WDL WDL       Cardiac WDL    Cardiac WDL WDL       Peripheral/Neurovascular WDL    Peripheral Neurovascular WDL WDL       Cognitive/Neuro/Behavioral WDL    Cognitive/Neuro/Behavioral WDL WDL

## 2022-10-21 NOTE — ED NOTES
Rapid Assessment Note    History:   Tom Rosario is a 47 year old male who presents with shortness of breath. The patient was diagnosed with pneumonia on 10/11/22 and completed a course of antibiotics today. He notes the soreness in his lungs resolved after antibiotics, but he continues to have shortness of breath. He states this is worse at night while sitting or trying to sleep. The patient denies heart or lung problems before his recent pneumonia. He confirms that he had a CT scan one week ago.     CT ANGIO CHEST W IV CONT PE STUDY (10/11/22)  1. No evidence of pulmonary embolism.  2. Patchy areas of groundglass opacity in a central distribution, right greater than left. Appearance most suggestive of atypical infection, though edema could have a similar appearance.    Exam:   General:  Alert, interactive  Cardiovascular:  Well perfused  Lungs:  No respiratory distress, no accessory muscle use  Neuro:  Moving all 4 extremities  Skin:  Warm, dry  Psych:  Normal affect      Plan of Care:   I evaluated the patient and developed an initial plan of care. I discussed this plan and explained that I, or one of my partners, would be returning to complete the evaluation.     Labs and repeat x-ray pending to assess for worsening of infection.  CT suggest atypical infection looks like patient was prescribed amoxicillin, may need to consider adding atypical coverage?    I, Hailey Rudolph, am serving as a scribe to document services personally performed by Zenon Bansal MD based on my observations and the provider's statements to me.    10/21/2022  [employer]    Portions of this medical record were completed by a scribe. UPON MY REVIEW AND AUTHENTICATION BY ELECTRONIC SIGNATURE, this confirms (a) I performed the applicable clinical services, and (b) the record is accurate.      Zenon Bansal MD  10/21/22 1994

## 2022-10-22 NOTE — CARE PLAN
"During assessment, pt reported that he has not voided since coming into the ED. Pt stated that this is his \"normal\" and he frequently does not void for long periods of time. Writer encouraged pt to try to use bathroom or urinal. Pt declined either intervention. MD notified face-to-face. No further interventions at this time.   "

## 2022-10-22 NOTE — PROVIDER NOTIFICATION
Pt c/o sharp chest pain rated 8/10. States it is worse with deep breaths. SL Nitroglycerine given x1. VSS. MD notified for further interventions.    EKG and labs ordered. Pt states chest pain is unchanged. Per MD give up to 2 more SL nitro and if unrelieved to let MD know for further orders.    Upon re entering pts room to assess chest pain he now rates it a 1/10. EKG completed. Pt remains on 2 L O2 for patient comfort. He states it is more difficult to breathe without oxygen. O2 sats 94% on Room air. MD updated.

## 2022-10-22 NOTE — PLAN OF CARE
Vitals are Temp: 98.2  F (36.8  C) Temp src: Oral BP: (!) 124/96 Pulse: 90   Resp: 18 SpO2: 99 %.    Pt A&Ox4. VSS on RA. No reports of chest pain at this time. Heparin running at 960 units/hr. Up with SBA. Special precautions maintained. Will continue monitoring and providing cares. Tele monitoring.

## 2022-10-22 NOTE — ED NOTES
Pt turned on his call light. Pt reports increased shortness of breath and right sided chest pain below his right axilla. Pt says the shortness of breath feels like he has been feeling for the past few nights but the chest pain is new. No adventitious lung sounds heard during assessment. Pt's oxygen saturation 100% on room air. Pt placed on 2L NC for comfort.  Hospitalist paged with the new information for request as how we should proceed.

## 2022-10-22 NOTE — PLAN OF CARE
A&O. VSS. C/o chest pain on and off overnight. SL nitro given x2 w/ some relief. EKG and labs completed. On 2 L O2 NC for comfort. LS diminished. Tele SR w/ inverted T's. Heparin gtt infusing @ 1110 units/hr. Cardiology consult.

## 2022-10-22 NOTE — PROGRESS NOTES
Update:      Patient had recurrence of right-sided chest pain and shortness of breath while still in the ER.  Pain resolved with 2 nitroglycerin, compatible with initial suspicion for non-ST elevation MI/ACS.    Also, he was noted to have his COVID-19 swab returned positive.  Still suspect a primary cardiac issue but of course we will have to maintain special respiratory precautions.  Defer CT PE work-up as he already had a CT angio with IV contrast on 10/11/2022 for the symptoms which was negative for clot.  He has being anticoagulated for a cardiac indication however.  No indication for steroids at this time given absence of hypoxia.    Luan Ha MD     Spontaneous, unlabored and symmetrical

## 2022-10-22 NOTE — PROVIDER NOTIFICATION
"Tele reported to writer at this time of 16 beats of V tach at 12:20. Vitals: BP (!) 127/99 (BP Location: Left arm)   Pulse 86   Temp 98.1  F (36.7  C) (Oral)   Resp 16   Ht 1.905 m (6' 3\")   Wt 80.8 kg (178 lb 3.2 oz)   SpO2 99%   BMI 22.27 kg/m      Provider notified face-to-face. No new interventions.   "

## 2022-10-22 NOTE — PROVIDER NOTIFICATION
Pt c/o chest pain rated 8/10. SL Nitro given x1 w/ decrease in pain to 2/10. MD notified.    Received call back from MD w/ no new orders.

## 2022-10-22 NOTE — ED NOTES
Writer attempts to call wife with an update on pt's positive COVID test. Wife does not answer. Pt is updated. Will continue to monitor.

## 2022-10-22 NOTE — PLAN OF CARE
Goal Outcome Evaluation: Progressing.    POC reviewed with: Patient    Pertinent Assessments: VSS, except on 2L of O2 via NC for comfort. A/Ox4. Reports 4/10 CP that is bilateral and pt describes as an ache. Reports CP is constant, but changed from the CP that required doses of PRN nitroglycerin over night. Tele SR this AM, currently Tele SR with Ivt T waves. LS clear. Infrequent dry cough reported.   Major Shift Events: Echo completed, see report.   Treatment Plan: Hep gtt continued. Cards following, plan for tx to higher level of care.

## 2022-10-22 NOTE — PROGRESS NOTES
[facility]    Hospitalist cross cover note  Name: Tom Rosario    MRN: [id number]  Provider: Madeline Hall MD  Date of Service: 10/22/2022    Assessment & Plan   Summary of Stay: Tom Rosario is a 47 year old male who was admitted on 10/21/2022 with ongoing dyspnea and chest pain.  Admitted for non-ST elevation MI started on IV heparin.  Has recurrent pain with no relief with 1 sublingual nitro.  Is now requiring supplemental O2 as well.  Patient is also positive for COVID-19.    Chest pain dyspnea on exertion   Non-ST elevation MI  COVID-19 positive  -Improved with nitroglycerin  -Checked a D-dimer at 0.56 was 0.5 on 1 10/11/2022  -Patient is not hypoxic, maintaining oxygenation on room air 94% on room air.  CTA 10/11/2022 was negative for PE  -EKG repeat showed T wave inversion as previously noted in lateral leads but also did show some subtle T wave depressions in inferior to 3 and aVF  -Troponin is plateaued it was 142 down from 177  -Patient is now pain-free  -Continue IV heparin  -Monitor on telemetry  -Does not meet criteria for Decadron at this time for COVID-19.  Given that his maintaining his oxygenation  -      DVT Prophylaxis: On IV heparin  Code Status: Full Code          Interval History   Cross cover page as patient had ongoing chest pain.  Required additional nitroglycerin with some improvement repeated EKG troponin and D-dimer.  Patient after initial presentation is now pain-free.  Maintaining oxygenation on room air.  More than 10 point review of systems was carried out was otherwise negative.    Total time spent in direct patient care is more than 35 minutes    -Data reviewed today: I reviewed all new labs and imaging reports over the last 24 hours. I personally reviewed the EKG tracing showing Sinus with T wave inversion in 2 3 aVF and lateral leads.  T inversion in 2 3 aVF is more pronounced when compared to prior EKG.    Physical Exam   Temp: 98  F (36.7  C) Temp src: Oral BP:  (!) 120/90 Pulse: 82   Resp: 18 SpO2: 97 % O2 Device: Nasal cannula Oxygen Delivery: 2.5 LPM  Vitals:    10/21/22 1750 10/21/22 2218   Weight: 80.6 kg (177 lb 11.1 oz) 80.8 kg (178 lb 3.2 oz)     Vital Signs with Ranges  Temp:  [97.6  F (36.4  C)-98.2  F (36.8  C)] 98  F (36.7  C)  Pulse:  [] 82  Resp:  [12-44] 18  BP: (119-137)/() 120/90  SpO2:  [97 %-100 %] 97 %  No intake/output data recorded.      GEN:  Alert, oriented x 3,  NAD.  HEENT:  Normocephalic/atraumatic, no scleral icterus, no nasal discharge, mouth moist.  CV:  Regular rate and rhythm, no murmur or JVD.  S1 + S2 noted, no S3 or S4.  LUNGS:  Clear to auscultation bilaterally without rales/rhonchi/wheezing/retractions.  Symmetric chest rise on inhalation noted.  ABD:  Active bowel sounds, soft, non-tender/non-distended.  No rebound/guarding/rigidity.  EXT:  No edema.    SKIN:  Dry to touch,    Medications     heparin 960 Units/hr (10/21/22 2252)     - MEDICATION INSTRUCTIONS -         aspirin  81 mg Oral Daily     heparin ANTICOAGULANT Loading dose  30 Units/kg Intravenous Once     metoprolol tartrate  25 mg Oral BID     sodium chloride (PF)  3 mL Intracatheter Q8H     Data     No results for input(s): PH, PHV, PO2, PO2V, SAT, PCO2, PCO2V, HCO3, HCO3V in the last 168 hours.  Recent Labs   Lab 10/21/22  1751 10/21/22  1524   WBC 5.3 5.4   HGB 13.1* 13.5   HCT 42.1 43.7   MCV 96 97   * 509*     Recent Labs   Lab 10/21/22  1524      POTASSIUM 4.8   CHLORIDE 107   CO2 24   ANIONGAP 9   GLC 91   BUN 20.5*   CR 1.18*   GFRESTIMATED 76   CHARLIE 8.9     7-Day Micro Results     Collected Updated Procedure Result Status      10/21/2022 1750 10/21/2022 1904 Symptomatic; Yes; 10/14/2022 Influenza A/B & SARS-CoV2 (COVID-19) Virus PCR Multiplex Nasopharyngeal [[id number]]    (Abnormal)   Swab from Nasopharyngeal    Final result Component Value   Influenza A PCR Negative   Influenza B PCR Negative   RSV PCR Negative   SARS CoV2 PCR Positive    POSITIVE: SARS-CoV-2 (COVID-19) RNA detected, presumed positive.                Recent Labs   Lab 10/21/22  1524   NTBNPI 2,732*     No results for input(s): CKT in the last 168 hours.    Invalid input(s): CK, CK TOTAL  Recent Labs   Lab 10/21/22  1524   CR 1.18*     No results for input(s): SED, CRP in the last 168 hours.  Recent Labs   Lab 10/21/22  1524   GLC 91     Recent Labs   Lab 10/21/22  1751 10/21/22  1524   HGB 13.1* 13.5     No results for input(s): AST, ALT, GGT, ALKPHOS, BILITOTAL, BILICONJ, BILIDIRECT, JALYN in the last 168 hours.    Invalid input(s): BILIRUBININDIRECT  Recent Labs   Lab 10/21/22  1524   INR 1.16*     No results for input(s): LACT in the last 168 hours.    Recent Results (from the past 24 hour(s))   Chest XR,  PA & LAT    Narrative    CHEST TWO VIEWS    10/21/2022 3:53 PM     HISTORY: Evaluate for worsening pneumonia.    COMPARISON: None available      Impression    IMPRESSION: PA and lateral views of the chest were obtained.  Cardiomediastinal silhouette is within normal limits. Mild right  perihilar predominant pulmonary opacities, indeterminate, could  represent infection or atelectasis. No significant pleural effusion or  pneumothorax.    ALIREZA PENN MD         SYSTEM ID:  [id number]

## 2022-10-22 NOTE — PROGRESS NOTES
"THIS CONSULT IS DONE REMOTELY (video) DUE TO ACTIVE COVID INFECTION.    Visit/Communication Style   Virtual (Video) communication was used to evaluate Tom.  Tom consented to the use of video communication.  Video START time:1:45p10/22/2022  Video STOP time: 2:08, 10/22/2022   Patient's location: [place] [address] [facility]  Provider's location during the visit: [facility]    REASON FOR CONSULT- CP AND ELEVATED CARDIAC ENZYMES  HPI- 46 yo male admitted with CP described as a heaviness, and SOB and cough.  He was seen in urgent care 10/11/22 and c/o cough with hemoptysis, chills and chest \"soreness\". He admitted to testing positive for COVID 2 weeks earlier.  A chest CT at urgent care was negative for PE, but had patchy ground glass opacities.  He was started on Amoxicillin and Azithromycin for pneumonia which he completed. He had noted to the ER doc, that the soreness in his chest resolved but breathing difficulties persisted.  Since admission he has c/o chest tightness across his chest, worse with coughing.  NO prior cardiac history.  PMH none  PSH none  MEDs none  Allergies- none  SOCIAL- works for [employer], no alcohol, occ cigar  FH negative for premature CAD  ROS- as above otherwise negative    Afebrile  -136/  HR 80-98  RR 16  Oximetry 99% 2L per nc  TELE no arrhythmias  GENERAL: Healthy, alert and no distress  EYES: Eyes grossly normal to inspection.  No discharge or erythema, or obvious scleral/conjunctival abnormalities.  RESP: No audible wheeze, cough, or visible cyanosis.  No visible retractions or increased work of breathing.    SKIN: Visible skin clear. No significant rash, abnormal pigmentation or lesions.  NEURO: Cranial nerves grossly intact.  Mentation and speech appropriate for age.  PSYCH: Mentation appears normal, affect normal/bright, judgement and insight intact, normal speech and appearance well-groomed.  No neuro deficits    ECG- NSR, LAD, 0.5mmST depression " lateral, T wave inversion, QTc 506msec    ECHO- severely dilated LV, severe biventricular cardiomyopathy with very well organized mass in apex- thrombus vs. Tumor (Hyperechoic edge well organized- old thrombus? )    LABS- trop 177,128, 122  NTBNP 2,732  procalcitonin 0.06  Cr 1.18    Assessment/Plan    1. COVID infection- ongoing respiratory distress, on supplemental O2  2. Severe biventricular cardiomyopathy with probable LV thrombus (its very well organized, tumor cannot be excluded, but clinical history suggests likely thrombus)  Recommend transfer to [facility] (or other available) for advanced cardiac evaluation. Patient will need coronary angiography and likely cardiac MRI to eval cause of cardiomyopathy and LV thrombus vs tumor.  Continue heparin gtt for presumed LV thrombus.  Tolerating low dose metoprolol. Consider adding afterload reduction as BP tolerates  Discussed with patient and hospitalist team.  Also discussed with cardiology at [facility] ( but no bed currently available there)    Medical complexity HIGH

## 2022-10-22 NOTE — ED NOTES
Orders obtained for Ekg and nitroglycerin SL. Pt pain free after 2 doses of NitoSL. Provider notified and he reviewed Ekg.     Fiordaliza Nunez RN on 10/21/2022 at 9:39 PM

## 2022-10-22 NOTE — PROGRESS NOTES
"[facility]    Hospitalist Progress Note  Name: Tom Rosario    MRN: [id number]  Provider:  Ricky Garcia DO  Date of Service: 10/22/2022    Summary of Stay: Tom Rosario is a 47 year old male with an unremarkable medical history who was admitted on 10/21/2022 with shortness of breath and chest heaviness.  In the emergency department, the patient was found to have a blood pressure of 124/105, heart rate 101, temperature 97.6  F, respiratory rate 18, SPO2 99% on room air.  Initial lab work showed BUN/creatinine 20.5/1.18, platelet 509, proBNP 2732, procalcitonin 0.06, high-sensitivity troponin 177 that improved to 142 upon recheck.  The patient tested positive for COVID-19 on 10/21.  The patient is unvaccinated.  Chest x-ray showed mild right perihilar predominant pulmonary opacities possibly representing infection versus atelectasis.  EKG showed sinus rhythm with possible left atrial enlargement and T wave inversions in the lateral leads.  The patient was started on a heparin drip.  Cardiology was consulted to see the patient.  Overnight on 10/22, the patient did have recurrent chest pain improved with sublingual nitroglycerin.    TODAY'S PLAN:  Pt with chest pain overnight, improved with sublingual nitroglycerin.  Discussed with Cardiology who is recommending transfer to [facility] for coronary angiogram.  Discussed with pt who is agreeable to transfer.  Continue heparin drip and plan for echocardiogram today.  NPO for now.  Called Transfer Line and await call back.  Fortunately pt seems relatively asymptomatic from a covid perspective.    ADDENDUM:  Echo results show EF 15-20%, grade 3 diastolic dysfunction, severely reduced LV systolic function, apical LV thrombus.  Cardiology recommending transfer to [facility].  Pt placement contacted and pt accepted at [facility], but no bed available right now.  Plan is to \"prioritize\" his transfer.  Pt is agreeable to transfer.  Echo results and transfer " recommendation discussed with pt.  All questions answered.    Problem List:   Chest Pain  Dyspnea on Exertion  NSTEMI  - Improved with nitroglycerin SL  - Appreciate Cardiology recommendations - recommending transfer to [facility] for coronary angiogram  - Continue heparin drip  - Continue ASA and metoprolol  - Nitroglycerin SL prn  - Check echocardiogram  - Telemetry    Covid 19 Infection  - Symptom Onset approximately 10/8  - Pt is unvaccinated  - Received Azithromycin and Amoxicillin on 10/11 for 10 days    DVT Prophylaxis: Heparin drip  Code Status: Full Code  Diet: NPO for Medical/Clinical Reasons Except for: Meds    Duenas Catheter: Not present  Disposition: Expected discharge in 2-3 days to home. Goals prior to discharge include cardiac work up complete.   Family updated today: No     Interval History   Pt seen and examined.  Pt admits to chest tightness as a band across his abdomen, which is different than overnight pain that was improved with nitroglycerin.    -Data reviewed today: I personally reviewed all new labs and imaging results over the last 24 hours.     Physical Exam   Temp: 98  F (36.7  C) Temp src: Oral BP: (!) 118/90 Pulse: 86   Resp: 16 SpO2: 98 % O2 Device: Nasal cannula Oxygen Delivery: 2 LPM  Vitals:    10/21/22 1750 10/21/22 2218   Weight: 80.6 kg (177 lb 11.1 oz) 80.8 kg (178 lb 3.2 oz)     Vital Signs with Ranges  Temp:  [97.6  F (36.4  C)-98.2  F (36.8  C)] 98  F (36.7  C)  Pulse:  [] 86  Resp:  [12-44] 16  BP: (113-137)/() 118/90  SpO2:  [97 %-100 %] 98 %  No intake/output data recorded.    GENERAL: No apparent distress. Awake, alert, and fully oriented.  HEENT: Normocephalic, atraumatic. Extraocular movements intact.  CARDIOVASCULAR: Regular rate and rhythm without murmurs or rubs. No S3.  PULMONARY: Clear bilaterally.  GASTROINTESTINAL: Soft, non-tender, non-distended. Bowel sounds normoactive.   EXTREMITIES: No cyanosis or clubbing. No edema.  NEUROLOGICAL: CN 2-12  grossly intact, no focal neurological deficits.  DERMATOLOGICAL: No rash, ulcer, bruising, nor jaundice.    Medications     heparin 1,110 Units/hr (10/22/22 0809)     - MEDICATION INSTRUCTIONS -         aspirin  81 mg Oral Daily     metoprolol tartrate  25 mg Oral BID     sodium chloride (PF)  3 mL Intracatheter Q8H     Data     Laboratory:  Recent Labs   Lab 10/21/22  1751 10/21/22  1524   WBC 5.3 5.4   HGB 13.1* 13.5   HCT 42.1 43.7   MCV 96 97   * 509*     Recent Labs   Lab 10/22/22  0633 10/21/22  1524   NA  --  140   POTASSIUM 4.6 4.8   CHLORIDE  --  107   CO2  --  24   ANIONGAP  --  9   GLC  --  91   BUN  --  20.5*   CR  --  1.18*   GFRESTIMATED  --  76   CHARLIE  --  8.9     No results for input(s): TROPONIN, TROPI, TROPR, TROPONINIS in the last 168 hours.    Invalid input(s): TROPT, TROP, TROPONINIES, TNIH  No results for input(s): CULT in the last 168 hours.    Imaging:  Recent Results (from the past 24 hour(s))   Chest XR,  PA & LAT    Narrative    CHEST TWO VIEWS    10/21/2022 3:53 PM     HISTORY: Evaluate for worsening pneumonia.    COMPARISON: None available      Impression    IMPRESSION: PA and lateral views of the chest were obtained.  Cardiomediastinal silhouette is within normal limits. Mild right  perihilar predominant pulmonary opacities, indeterminate, could  represent infection or atelectasis. No significant pleural effusion or  pneumothorax.    ALIREZA PENN MD         SYSTEM ID:  [id number]         Ricky Garcia DO  [facility] Hospitalist  [address] [place] [address]  10/22/2022

## 2022-10-23 NOTE — PLAN OF CARE
Goal Outcome Evaluation: Progressing.    POC reviewed with: patient    Pertinent Assessments: VSS. A/Ox4. Tele SR with Ivt Ts. Denies CP. On RA. Up SBA.   Major Shift Events: None   Treatment Plan: Continue Hep gtt. Cards following. Tx to [facility] when bed available.

## 2022-10-23 NOTE — PROGRESS NOTES
CHART REVIEWED  15 RUN OF NSVT- ASYMPTOMATIC  Mildly hypertensive, normal HR    ECG- NSR, LAD, 0.5mmST depression lateral, T wave inversion, QTc 506msec     ECHO- severely dilated LV, severe biventricular cardiomyopathy with very well organized mass in apex- thrombus vs. Tumor (Hyperechoic edge well organized- old thrombus? )     LABS- trop 177,128, 122  NTBNP 2,732  procalcitonin 0.06  Cr 1.18  K 4.1       Assessment/Plan     1. COVID infection- ongoing respiratory distress, on supplemental O2  2. Severe biventricular cardiomyopathy with probable LV thrombus (its very well organized, tumor cannot be excluded, but clinical history suggests likely thrombus)  Recommend transfer to [facility] (or other available) for advanced cardiac care. Patient will need coronary angiography and likely cardiac MRI to eval cause of cardiomyopathy and LV thrombus vs tumor.  Continue heparin gtt for presumed LV thrombus.  Tolerating low dose metoprolol. Increased metoprolol due to ventricular arrhythmia.   Consider adding afterload reduction as BP tolerates  3. QTc prolongation with NSVT,-K- 4.1, check magnesium level, increased metoprolol to 37.5mg BID    Awaiting transfer to [facility]

## 2022-10-23 NOTE — PROVIDER NOTIFICATION
Provider Notified : paged provider for diet order per patient's request. Patient is concerned that he might not be [facility] and is worried he has not eaten for 24 hours.     Placed order for cardiac diet. NPO @ [place] pending possible transfer in AM.     Harmeet Carter MD

## 2022-10-23 NOTE — PLAN OF CARE
Goal Outcome Evaluation:    A&Ox4. SBA. NPO. Q4 blood sugars. Heparin infusing. Tolerated removing 2L NC well. Denies chest pain. TELE: SR w/ inverted T-waves & long Qtc. Plan: waiting transfer to [facility]. COVID positive, special precautions maintained.

## 2022-10-23 NOTE — PROGRESS NOTES
[facility]    Hospitalist Progress Note  Name: Tom Rosario    MRN: [id number]  Provider:  Ricky Garcia DO  Date of Service: 10/23/2022    Summary of Stay: Tom Rosario is a 47 year old male with an unremarkable medical history who was admitted on 10/21/2022 with shortness of breath and chest heaviness.  In the emergency department, the patient was found to have a blood pressure of 124/105, heart rate 101, temperature 97.6  F, respiratory rate 18, SPO2 99% on room air.  Initial lab work showed BUN/creatinine 20.5/1.18, platelet 509, proBNP 2732, procalcitonin 0.06, high-sensitivity troponin 177 that improved to 142 upon recheck.  The patient tested positive for COVID-19 on 10/21.  The patient is unvaccinated.  Chest x-ray showed mild right perihilar predominant pulmonary opacities possibly representing infection versus atelectasis.  EKG showed sinus rhythm with possible left atrial enlargement and T wave inversions in the lateral leads.  The patient was started on a heparin drip.  Cardiology was consulted to see the patient.  Overnight on 10/22, the patient did have recurrent chest pain improved with sublingual nitroglycerin.  The patient's echocardiogram returned showing EF 15 to 20%, grade 3 diastolic dysfunction, severely reduced left ventricular systolic function, apical left ventricular thrombus versus tumor.  With the new echo findings, cardiology recommended transfer to the [facility].  This was discussed with the patient who was agreeable.    TODAY'S PLAN: Await transfer to the [facility].  The patient was accepted now just waiting for open bed.  Contacted [facility] this morning and the patient is on the wait list.  They have a bed meeting this morning and we will know more after that.  Appreciate cardiology recommendations.  Continue heparin drip, metoprolol, aspirin.  Fortunately patient does not appear symptomatic with any further chest pain  overnight.  Continue NPO this AM.  If no transfer by this afternoon, will consider cardiac diet and NPO at midnight again.    Problem List:   Chest Pain  Dyspnea on Exertion  NSTEMI  - Improved with nitroglycerin SL  - Appreciate Cardiology recommendations - recommending transfer to [facility].  Pt is agreeable.  Pt accepted at the [facility], now waiting for available bed.  - Continue heparin drip  - Continue ASA and metoprolol  - Nitroglycerin SL prn  - Echocardiogram showed EF 15 to 20%, grade 3 diastolic dysfunction, severely reduced left ventricular systolic function, apical left ventricular thrombus versus tumor  - Telemetry     Covid 19 Infection  - Symptom Onset approximately 10/8  - Pt is unvaccinated  - Received Azithromycin and Amoxicillin on 10/11 for 10 days    DVT Prophylaxis: heparin drip  Code Status: Full Code  Diet: NPO for Medical/Clinical Reasons Except for: Meds    Duenas Catheter: Not present  Disposition: Expected discharge to the [facility] pending bed availability.  Family updated today: No     Interval History   Pt seen and examined.  Pt denies cp, chest pressure, chest heaviness overnight.  Denies sob.    -Data reviewed today: I personally reviewed all new labs and imaging results over the last 24 hours.     Physical Exam   Temp: 97.8  F (36.6  C) Temp src: Oral BP: 121/86 Pulse: 78   Resp: 22 SpO2: 90 % O2 Device: None (Room air) Oxygen Delivery: 2 LPM  Vitals:    10/21/22 1750 10/21/22 2218 10/23/22 0620   Weight: 80.6 kg (177 lb 11.1 oz) 80.8 kg (178 lb 3.2 oz) 79.8 kg (175 lb 14.4 oz)     Vital Signs with Ranges  Temp:  [97.8  F (36.6  C)-98.2  F (36.8  C)] 97.8  F (36.6  C)  Pulse:  [78-88] 78  Resp:  [16-22] 22  BP: (118-132)/() 121/86  SpO2:  [90 %-100 %] 90 %  No intake/output data recorded.    GENERAL: No apparent distress. Awake, alert, and fully oriented.  HEENT: Normocephalic, atraumatic. Extraocular movements intact.  CARDIOVASCULAR: Regular rate and rhythm without murmurs or rubs.  No S3.  PULMONARY: Clear bilaterally.  GASTROINTESTINAL: Soft, non-tender, non-distended. Bowel sounds normoactive.   EXTREMITIES: No cyanosis or clubbing. No edema.  NEUROLOGICAL: CN 2-12 grossly intact, no focal neurological deficits.  DERMATOLOGICAL: No rash, ulcer, bruising, nor jaundice.    Medications     heparin 1,260 Units/hr (10/23/22 0411)     - MEDICATION INSTRUCTIONS -         aspirin  81 mg Oral Daily     metoprolol tartrate  25 mg Oral BID     sodium chloride (PF)  3 mL Intracatheter Q8H     Data     Laboratory:  Recent Labs   Lab 10/21/22  1751 10/21/22  1524   WBC 5.3 5.4   HGB 13.1* 13.5   HCT 42.1 43.7   MCV 96 97   * 509*     Recent Labs   Lab 10/23/22  0636 10/23/22  0617 10/23/22  0147 10/22/22  1726 10/22/22  0633 10/21/22  1524   NA  --   --   --   --   --  140   POTASSIUM 4.1  --   --   --  4.6 4.8   CHLORIDE  --   --   --   --   --  107   CO2  --   --   --   --   --  24   ANIONGAP  --   --   --   --   --  9   GLC  --  82 89 97  --  91   BUN  --   --   --   --   --  20.5*   CR  --   --   --   --   --  1.18*   GFRESTIMATED  --   --   --   --   --  76   CHARLIE  --   --   --   --   --  8.9     No results for input(s): CULT in the last 168 hours.    Imaging:  Recent Results (from the past 24 hour(s))   Echocardiogram Complete   Result Value    LVEF  15-20%    Narrative    [id number]  [id number]  [id number]  [id number]^POLO^DIANA^MICHELE     [facility]  Echocardiography Laboratory  [address] [place] [address]     Name: COLE HARRIS  MRN: [id number]  : 1975  Study Date: 10/22/2022 09:54 AM  Age: 47 yrs  Gender: Male  Patient Location: [facility]  Reason For Study: MI  Ordering Physician: DIANA CUELLAR  Performed By: Octaviano Silva RDCS     BSA: 2.1 m2  Height: 75 in  Weight: 178 lb  HR: 81  BP: 118/90 mmHg  ______________________________________________________________________________  Procedure  Complete Portable Echo  Adult.  ______________________________________________________________________________  Interpretation Summary     Dilated biventricular cardiomyoppathy with severe LV and RV systolic function  EF 15-20%  Large very well organized mass in apex 2.5cm by 3.0cm, possible thrombus vs  tumor  Results communicated to ordering physician. There is no comparison study  available.  ______________________________________________________________________________  Left Ventricle  The left ventricle is severely dilated. The visual ejection fraction is 15-  20%. Grade III or advanced diastolic dysfunction. Left ventricular systolic  function is severely reduced. There is severe global hypokinesia of the left  ventricle. There is an apical left ventricular thrombus.     Right Ventricle  The right ventricle is grossly normal size. Severely decreased right  ventricular systolic function.     Atria  The left atrium is severely dilated. The right atrium is severely dilated.  There is no color Doppler evidence of an atrial shunt.     Mitral Valve  The mitral valve is normal in structure and function. There is mild to  moderate (1-2+) mitral regurgitation. There is no mitral valve stenosis.     Tricuspid Valve  The tricuspid valve is not well visualized, but is grossly normal. There is  trace tricuspid regurgitation. The right ventricular systolic pressure is  approximated at 30.5 mmHg plus the right atrial pressure.     Aortic Valve  The aortic valve is trileaflet. No hemodynamically significant valvular aortic  stenosis.     Pulmonic Valve  The pulmonic valve is not well seen, but is grossly normal. Normal pulmonic  valve velocity.     Vessels  Normal size aorta. Dilation of the inferior vena cava is present with abnormal  respiratory variation in diameter.     Pericardium  Trivial pericardial effusion.     ______________________________________________________________________________  MMode/2D Measurements & Calculations  IVSd: 0.93  cm  LVIDd: 7.0 cm  LVIDs: 6.8 cm  LVPWd: 0.90 cm  FS: 2.3 %     LV mass(C)d: 285.8 grams  LV mass(C)dI: 136.9 grams/m2  Ao root diam: 3.0 cm  LA dimension: 5.1 cm  asc Aorta Diam: 3.0 cm  LA/Ao: 1.7  LA Volume (BP): 110.0 ml  LA Volume Index (BP): 52.6 ml/m2  RWT: 0.26     Doppler Measurements & Calculations  MV E max aneesh: 80.6 cm/sec  MV A max aneesh: 33.0 cm/sec  MV E/A: 2.4     MV dec time: 0.13 sec  PA acc time: 0.08 sec  PI end-d aneesh: 171.0 cm/sec  TR max aneesh: 276.0 cm/sec  TR max P.5 mmHg  E/E' av.8  Lateral E/e': 15.7  Medial E/e': 31.8     ______________________________________________________________________________  Report approved by: Adilia He 10/22/2022 12:45 PM               Ricky Garcia DO  [facility] Hospitalist  [address] [place] [address]  10/23/2022

## 2022-10-23 NOTE — PLAN OF CARE
Goal Outcome Evaluation:    A&OX4. LS clear. VSS oxygen 99% on 2L NC. Reported of chest soreness at rest. Patient states that it feels better than before. NPO expt for meds and ice. Up with SBA to bathroom. Patient is waiting for an open bed to be transferred to the [facility].  Heparin infusing at 1260 units/hr. Continue to monitor.

## 2022-10-24 NOTE — PROGRESS NOTES
[facility]    Cardiology Progress Note    ASSESSMENT:  47-year-old male seen for new biventricular cardiomyopathy.  Symptoms started October 9 with chest pain and shortness of breath.  EF found to be 20% with RV dysfunction and also large LV apical thrombus.  Troponin was minimally elevated and downtrending, this could be a tail and of an MI several weeks ago, although no Q waves on EKG.  Echo does not necessarily fit noncompaction or hypertrophic cardiomyopathy.    He appears euvolemic and is hemodynamically stable, he is perfusing well.  Currently awaiting transfer to [place], he will need cardiac MRI to assess viability and hopefully gain some insight into the cause of the cardiomyopathy.  Likely he will need angiogram once viability has been determined.    RECOMMENDATIONS:  1.  New biventricular cardiomyopathy, unclear etiology  -Continue low-dose aspirin and metoprolol  -Eventual cardiac MRI and angiogram    2.  LV apical thrombus  - Continue heparin drip    Awaiting transfer to the [facility], patient has been accepted, awaiting bed.    Azar Ames MD  Cardiology - [facility]  Pager:  [phone]  Text Page    SUBJECTIVE: Feels well this morning.  No shortness of breath or orthopnea overnight.    MEDICATIONS:  Current Facility-Administered Medications   Medication     acetaminophen (TYLENOL) tablet 650 mg    Or     acetaminophen (TYLENOL) Suppository 650 mg     aspirin EC tablet 81 mg     heparin 25,000 units in 0.45% NaCl 250 mL ANTICOAGULANT infusion     HYDROmorphone (PF) (DILAUDID) injection 0.3 mg     lidocaine (LMX4) cream     lidocaine 1 % 0.1-1 mL     melatonin tablet 1 mg     metoprolol tartrate (LOPRESSOR) half-tab 37.5 mg     nitroGLYcerin (NITROSTAT) sublingual tablet 0.4 mg     ondansetron (ZOFRAN ODT) ODT tab 4 mg    Or     ondansetron (ZOFRAN) injection 4 mg     Patient is already receiving anticoagulation with heparin, enoxaparin (LOVENOX), warfarin  (COUMADIN)  or other anticoagulant medication     senna-docusate (SENOKOT-S/PERICOLACE) 8.6-50 MG per tablet 1 tablet    Or     senna-docusate (SENOKOT-S/PERICOLACE) 8.6-50 MG per tablet 2 tablet     sodium chloride (PF) 0.9% PF flush 3 mL     sodium chloride (PF) 0.9% PF flush 3 mL       ALLERGIES:  No Known Allergies    REVIEW OF SYSTEMS:  General: no fatigue, weight loss  Cardiac: per HPI  Respiratory: per HPI  GI: no nausea, emesis, melena  Musculoskeletal: no weakness  Neurologic: no aphasia, paraesthesias  Neuropsychiatric: no depression    PHYSICAL EXAM:  Temp: 97.4  F (36.3  C) Temp src: Oral BP: 112/80 Pulse: 75   Resp: 18 SpO2: 99 % O2 Device: Nasal cannula Oxygen Delivery: 1 LPM  Vital Signs with Ranges  Temp:  [97  F (36.1  C)-98.1  F (36.7  C)] 97.4  F (36.3  C)  Pulse:  [75-92] 75  Resp:  [16-18] 18  BP: (106-135)/() 112/80  SpO2:  [93 %-99 %] 99 %  173 lbs 0 oz    Constitutional: awake, alert, Ox3  Eyes: PERRL, sclera nonicteric  ENT: trachea midline  Respiratory: Lungs clear  Cardiovascular: Regular rate and rhythm, no murmurs  GI: nondistended, nontender, bowel sounds present  Lymph/Hematologic: no lymphadenopathy  Skin: dry, no rash  Musculoskeletal: good muscle tone, strength 5/5 in upper and lower extremities  Neurologic: no focal deficits  Neuropsychiatric: appropriate affact    Intake/Output Summary (Last 24 hours) at 10/24/2022 0734  Last data filed at 10/23/2022 1926  Gross per 24 hour   Intake 480 ml   Output --   Net 480 ml     DATA:  Labs: Hemoglobin 13, platelets 461, WBC 5, K 4.0, Cr 1.3    Tele: sinus, rate 80's, 15b run of NSVT on 10-23    Echo: October 22: Severe LV dilation, EF 15 to 20%, LV apical thrombus, severe RV dysfunction, 1-2+ MR.

## 2022-10-24 NOTE — PLAN OF CARE
Per telemetry tech, pt's HR spiked to 185 briefly then sustained around 150 bpm. Had intermittent V tach over course of a minute or so. DBP elevated otherwise VSS. Pt resting in bed an assessment. Denies feeling any changes from previous. MD notified. EKG obtained. IV metoprolol order and given. Will continue to monitor.

## 2022-10-24 NOTE — PLAN OF CARE
Goal Outcome Evaluation:      Plan of Care Reviewed With: patient    Overall Patient Progress: no change    Outcome Evaluation: Awaiting bed availability at the [facility]    A&Ox4. VSS on 1 LPM nasal cannula. Denies pain, SOB. No CP this shift. Tele shows SR w/ inverted T waves. Heparin infusing @ 1410 units/hr. Next hep10a scheduled for 2045. Restarted cardiac diet. Transfer to [facility] pending. Continue w/ POC.

## 2022-10-24 NOTE — PLAN OF CARE
Goal Outcome Evaluation:    A&Ox4. SBA. Heparin infusing 1260/hr. 3/10 chest pain reported, resolved w/o nitroglycerin given. Brief episode of SOB, resolved by elevating HOB. Pt. Requested continuous pulse ox & 1L oxygen. TELE: .

## 2022-10-24 NOTE — PROGRESS NOTES
[facility]    Hospitalist Progress Note  Name: Tom Rosario    MRN: [id number]  Provider:  Ricky Garcia DO  Date of Service: 10/24/2022    Summary of Stay: Tom Rosario is a 47 year old male with an unremarkable medical history who was admitted on 10/21/2022 with shortness of breath and chest heaviness.  In the emergency department, the patient was found to have a blood pressure of 124/105, heart rate 101, temperature 97.6  F, respiratory rate 18, SPO2 99% on room air.  Initial lab work showed BUN/creatinine 20.5/1.18, platelet 509, proBNP 2732, procalcitonin 0.06, high-sensitivity troponin 177 that improved to 142 upon recheck.  The patient tested positive for COVID-19 on 10/21.  The patient is unvaccinated.  Chest x-ray showed mild right perihilar predominant pulmonary opacities possibly representing infection versus atelectasis.  EKG showed sinus rhythm with possible left atrial enlargement and T wave inversions in the lateral leads.  The patient was started on a heparin drip.  Cardiology was consulted to see the patient.  Overnight on 10/22, the patient did have recurrent chest pain improved with sublingual nitroglycerin.  The patient's echocardiogram returned showing EF 15 to 20%, grade 3 diastolic dysfunction, severely reduced left ventricular systolic function, apical left ventricular thrombus versus tumor.  With the new echo findings, cardiology recommended transfer to the [facility].  This was discussed with the patient who was agreeable.    TODAY'S PLAN:  [facility]  [facility]ing [facility].  Contacted transfer line this AM.  Wait list has shortened and pt is very close to transfer.  Ok for diet this afternoon.  Pt with episode of chest pain overnight when he laid flat, improved with nitroglycerin.  Denies any covid symptoms.  Hopeful for transfer later today vs tomorrow.    ADDENDUM:  Pt with -955.  Stable BP and asymptomatic.  Pt seen and  examined.  EKG shows 2:1 a flutter.  Will give IV lopressor.  If ineffective, consider amiodarone given his low EF of 15%.    Problem List:   Chest Pain  Dyspnea on Exertion  NSTEMI  - Improved with nitroglycerin SL  - Appreciate Cardiology recommendations - recommending transfer to [facility].  Pt is agreeable.  Pt accepted at the [facility], now waiting for available bed.  - Continue heparin drip  - Continue ASA and metoprolol  - Nitroglycerin SL prn  - Echocardiogram showed EF 15 to 20%, grade 3 diastolic dysfunction, severely reduced left ventricular systolic function, apical left ventricular thrombus versus tumor  - Telemetry     Covid 19 Infection  - Symptom Onset approximately 10/8  - Pt is unvaccinated  - Received Azithromycin and Amoxicillin on 10/11 for 10 days    DVT Prophylaxis: Heparin drip  Code Status: Full Code  Diet: Low Saturated Fat Na <2400 mg    Duenas Catheter: Not present  Disposition: Expected transfer to [facility] once bed available.  Family updated today: No     Interval History   Pt seen and examined.  Pt denies cp currently.  Had cp overnight when laid flat.  Improved with nitroglycerin sl.    -Data reviewed today: I personally reviewed all new labs and imaging results over the last 24 hours.     Physical Exam   Temp: 97.9  F (36.6  C) Temp src: Oral BP: 113/81 Pulse: 69   Resp: 16 SpO2: 100 % O2 Device: Nasal cannula Oxygen Delivery: 1 LPM  Vitals:    10/21/22 2218 10/23/22 0620 10/24/22 0620   Weight: 80.8 kg (178 lb 3.2 oz) 79.8 kg (175 lb 14.4 oz) 78.5 kg (173 lb)     Vital Signs with Ranges  Temp:  [97.4  F (36.3  C)-98.1  F (36.7  C)] 97.9  F (36.6  C)  Pulse:  [69-92] 69  Resp:  [16-18] 16  BP: (106-127)/(80-92) 113/81  SpO2:  [93 %-100 %] 100 %  I/O last 3 completed shifts:  In: 480 [P.O.:480]  Out: -     GENERAL: No apparent distress. Awake, alert, and fully oriented.  HEENT: Normocephalic, atraumatic. Extraocular movements intact.  CARDIOVASCULAR: Regular rate and rhythm without murmurs or  rubs. No S3.  PULMONARY: Clear bilaterally.  GASTROINTESTINAL: Soft, non-tender, non-distended. Bowel sounds normoactive.   EXTREMITIES: No cyanosis or clubbing. No edema.  NEUROLOGICAL: CN 2-12 grossly intact, no focal neurological deficits.  DERMATOLOGICAL: No rash, ulcer, bruising, nor jaundice.    Medications     heparin 1,410 Units/hr (10/24/22 0905)     - MEDICATION INSTRUCTIONS -         aspirin  81 mg Oral Daily     metoprolol tartrate  37.5 mg Oral BID     sodium chloride (PF)  3 mL Intracatheter Q8H     Data     Laboratory:  Recent Labs   Lab 10/24/22  0650 10/21/22  1751 10/21/22  1524   WBC 5.3 5.3 5.4   HGB 13.5 13.1* 13.5   HCT 42.4 42.1 43.7   MCV 93 96 97   * 483* 509*     Recent Labs   Lab 10/24/22  0650 10/23/22  0636 10/23/22  0617 10/23/22  0147 10/22/22  1726 10/22/22  0633 10/21/22  1524     --   --   --   --   --  140   POTASSIUM 4.0 4.1  --   --   --  4.6 4.8   CHLORIDE 104  --   --   --   --   --  107   CO2 23  --   --   --   --   --  24   ANIONGAP 11  --   --   --   --   --  9   *  --  82 89   < >  --  91   BUN 16.6  --   --   --   --   --  20.5*   CR 1.31*  --   --   --   --   --  1.18*   GFRESTIMATED 67  --   --   --   --   --  76   CHARLIE 8.7  --   --   --   --   --  8.9    < > = values in this interval not displayed.     No results for input(s): CULT in the last 168 hours.    Imaging:  No results found for this or any previous visit (from the past 24 hour(s)).      Ricky Garcia DO  [facility] Hospitalist  [address] [place] [address]  10/24/2022

## 2022-10-25 NOTE — PROVIDER NOTIFICATION
"Provider notified 0008: \"Tele called and pt had 12 beats of vtach, pt also had 6 beats earlier. \"    Provider notified 0419: \"FYI pt has been having multiple runs of vatch tonight. Mg is 1.6 but protocol is not having me replace. Please advise.\"  "

## 2022-10-25 NOTE — PLAN OF CARE
Goal Outcome Evaluation:      Plan of Care Reviewed With: patient, significant other    Overall Patient Progress: no change    Outcome Evaluation: Awaiting bed availability at the [facility]    A&Ox4. VSS on room air. Denies pain. No CP, SOB this shift. Tele shows SR w/ inverted T's. Heparin infusing @ 1410 units/hr. No complaints this shift. Awaiting bed availability at the .

## 2022-10-25 NOTE — PLAN OF CARE
VSS. A/Ox4. LS clear, on 1L for comfort. Pt having runs of asymptomatic vtach of 6, 12, and 8 beats per tele. MD notified. Mag high protocol ordered. Replaced via IV. Tele SR inverted T waves. Pt denies CP. Herpain running at 1410, recheck was within range. Plan to transfer to [facility] today.

## 2022-10-25 NOTE — PROGRESS NOTES
"Cardiology Progress Note  Beulah Ly APRN, CNP     Seen by Dr Hendrickson       Assessment and Plan:     Admit (10/21/22) with few week hx of progressive chest heaviness, SOB, cough. Testing positive for COVID (10/11/22) at urgent care and completed antibiotics for pneumonia.  Evidence here of respiratory distress, new severe biventricular dysfunction, LV thrombus, troponin elevation    No prior medical history    New Severe Biventricular Dysfunction, etiology unclear  -BNP: 2700 / no pleural effusions  -LVEF 15-20% (global), LVIDd 7.0 cm, grade III diastolic dysfunction, severe RV dysfunction, 1-2+ MR, RVSP mmHg  -weight down 7 lbs from admit (has not received any diuretics)  -tolerating Metoprolol  -will add Losartan 12.5 mg and change Lopressor to metoprolol XR 25 mg BID  -Plan for transfer to [facility] for further workup and cMRI to help determine etiology    NSTEMI:  -troponin peak 177 on admit and trended down / lateral T-wave inversion  -possible MI a few weeks ago  -NO CP overnight / no prior hx of CAD  -ASA, beta-blocker, IV-heparin    Episodic Rapid Narrow Complex Tachycardia, likely A-flutter  -lasted 1 hr (10/24/22)  -asymptomatic, hemodynamically stable  -in SR since    LV apical Thrombus  -on IV heparin    Nonsustained VT  -asymptomatic  -replete Mag    COVID Infection    Prolonged QTc                 Interval History:     Denies any chest pain  Resolved SOB  No palpitations                Medications:       aspirin  81 mg Oral Daily     metoprolol tartrate  37.5 mg Oral BID     sodium chloride (PF)  3 mL Intracatheter Q8H            Physical Exam:   Blood pressure 112/81, pulse 88, temperature 98  F (36.7  C), temperature source Oral, resp. rate 18, height 1.905 m (6' 3\"), weight 78.4 kg (172 lb 14.4 oz), SpO2 98 %.  Wt Readings from Last 3 Encounters:   10/25/22 78.4 kg (172 lb 14.4 oz)     I/O last 3 completed shifts:  In: 480 [P.O.:480]  Out: -     CONST:  Alert and oriented  NAD  LUNGS:  CTA " bilaterally  CARDIO:  RRR, S1, S2  + S3  No murmurs  ABD:  Thin +BS  EXT:  No edema  2+ DP bilaterally           Data:   TELE:  SR with prolonged QT inverval    CBC  Recent Labs   Lab 10/25/22  0626 10/24/22  0650   WBC 4.5 5.3   HGB 12.7* 13.5    461*       BMP  Recent Labs   Lab 10/25/22  0626 10/24/22  0650 10/23/22  0636 10/23/22  0617 10/23/22  0147 10/22/22  1726 10/22/22  0633 10/21/22  1524    138  --   --   --   --   --  140   POTASSIUM 3.8 4.0 4.1  --   --   --  4.6 4.8   CHLORIDE 105 104  --   --   --   --   --  107   CHARLIE 8.6 8.7  --   --   --   --   --  8.9   CO2 24 23  --   --   --   --   --  24   BUN 13.6 16.6  --   --   --   --   --  20.5*   CR 1.23* 1.31*  --   --   --   --   --  1.18*   * 103*  --  82 89   < >  --  91    < > = values in this interval not displayed.     Recent Labs   Lab Test 10/22/22  0633   CHOL 126   HDL 31*   LDL 77   TRIG 89       TROP  No results found for: TROPI, TROPONIN, TROPR    BNP  Recent Labs   Lab 10/21/22  1524   NTBNPI 2,732*

## 2022-10-25 NOTE — PROGRESS NOTES
Cross cover note    Magnesium protocol adjusted to high due to NSVT with magnesium 1.6.  Goal magnesium more than 2.    Bharath Rosario MD  Internal Medicine Hospitalist  Pager: [phone]     15

## 2022-10-25 NOTE — PROGRESS NOTES
[facility]    Hospitalist Progress Note  Name: Tom Rosario    MRN: [id number]  Provider:  Ricky Garcia DO  Date of Service: 10/25/2022    Summary of Stay: Tom Rosario is a 47 year old male with an unremarkable medical history who was admitted on 10/21/2022 with shortness of breath and chest heaviness.  In the emergency department, the patient was found to have a blood pressure of 124/105, heart rate 101, temperature 97.6  F, respiratory rate 18, SPO2 99% on room air.  Initial lab work showed BUN/creatinine 20.5/1.18, platelet 509, proBNP 2732, procalcitonin 0.06, high-sensitivity troponin 177 that improved to 142 upon recheck.  The patient tested positive for COVID-19 on 10/21.  The patient is unvaccinated.  Chest x-ray showed mild right perihilar predominant pulmonary opacities possibly representing infection versus atelectasis.  EKG showed sinus rhythm with possible left atrial enlargement and T wave inversions in the lateral leads.  The patient was started on a heparin drip.  Cardiology was consulted to see the patient.  Overnight on 10/22, the patient did have recurrent chest pain improved with sublingual nitroglycerin.  The patient's echocardiogram returned showing EF 15 to 20%, grade 3 diastolic dysfunction, severely reduced left ventricular systolic function, apical left ventricular thrombus versus tumor.  With the new echo findings, cardiology recommended transfer to the [facility].  This was discussed with the patient who was agreeable.    TODAY'S PLAN:  Pt accepted at [facility] but no bed available.  Contacted transfer line this AM.  No updates.  Appreciate Cardiology recommendations.      Problem List:   Chest Pain  Dyspnea on Exertion  NSTEMI  - Improved with nitroglycerin SL  - Appreciate Cardiology recommendations - recommending transfer to [facility].  Pt is agreeable.  Pt accepted at the [facility], now waiting for available bed.  - Continue heparin drip  - Continue ASA  and metoprolol  - Nitroglycerin SL prn  - Echocardiogram showed EF 15 to 20%, grade 3 diastolic dysfunction, severely reduced left ventricular systolic function, apical left ventricular thrombus versus tumor  - Telemetry    Paroxysmal Atrial Flutter  - Continue metoprolol  - Continue heparin drip     Covid 19 Infection  - Symptom Onset approximately 10/8  - Pt is unvaccinated  - Received Azithromycin and Amoxicillin on 10/11 for 10 days    DVT Prophylaxis: Heparin drip  Code Status: Full Code  Diet: Low Saturated Fat Na <2400 mg    Duenas Catheter: Not present  Disposition: Expected discharge to the [facility] when bed available  Family updated today: No     Interval History   Pt seen and examined.  Pt denies cp, sob.  BM last night.    -Data reviewed today: I personally reviewed all new labs and imaging results over the last 24 hours.     Physical Exam   Temp: 98  F (36.7  C) Temp src: Oral BP: 112/81 Pulse: 88   Resp: 18 SpO2: 98 % O2 Device: None (Room air) Oxygen Delivery: 1 LPM  Vitals:    10/23/22 0620 10/24/22 0620 10/25/22 0534   Weight: 79.8 kg (175 lb 14.4 oz) 78.5 kg (173 lb) 78.4 kg (172 lb 14.4 oz)     Vital Signs with Ranges  Temp:  [98  F (36.7  C)-98.1  F (36.7  C)] 98  F (36.7  C)  Pulse:  [] 88  Resp:  [16-18] 18  BP: (112-122)/() 112/81  SpO2:  [95 %-99 %] 98 %  I/O last 3 completed shifts:  In: 480 [P.O.:480]  Out: -     GENERAL: No apparent distress. Awake, alert, and fully oriented.  HEENT: Normocephalic, atraumatic. Extraocular movements intact.  CARDIOVASCULAR: Regular rate and rhythm without murmurs or rubs. No S3.  PULMONARY: Clear bilaterally.  GASTROINTESTINAL: Soft, non-tender, non-distended. Bowel sounds normoactive.   EXTREMITIES: No cyanosis or clubbing. No edema.  NEUROLOGICAL: CN 2-12 grossly intact, no focal neurological deficits.  DERMATOLOGICAL: No rash, ulcer, bruising, nor jaundice.    Medications     heparin 1,410 Units/hr (10/25/22 0812)     - MEDICATION INSTRUCTIONS -          aspirin  81 mg Oral Daily     losartan  12.5 mg Oral Daily     metoprolol succinate ER  25 mg Oral BID     sodium chloride (PF)  3 mL Intracatheter Q8H     Data     Laboratory:  Recent Labs   Lab 10/25/22  0626 10/24/22  0650 10/21/22  1751   WBC 4.5 5.3 5.3   HGB 12.7* 13.5 13.1*   HCT 40.5 42.4 42.1   MCV 94 93 96    461* 483*     Recent Labs   Lab 10/25/22  0626 10/24/22  0650 10/23/22  0636 10/23/22  0617 10/22/22  0633 10/21/22  1524    138  --   --   --  140   POTASSIUM 3.8 4.0 4.1  --    < > 4.8   CHLORIDE 105 104  --   --   --  107   CO2 24 23  --   --   --  24   ANIONGAP 10 11  --   --   --  9   * 103*  --  82   < > 91   BUN 13.6 16.6  --   --   --  20.5*   CR 1.23* 1.31*  --   --   --  1.18*   GFRESTIMATED 72 67  --   --   --  76   CHARLIE 8.6 8.7  --   --   --  8.9    < > = values in this interval not displayed.     No results for input(s): CULT in the last 168 hours.    Imaging:  No results found for this or any previous visit (from the past 24 hour(s)).      Ricky Garcia DO  [facility] Hospitalist  [address] [place] [address]  10/25/2022

## 2022-10-25 NOTE — PROGRESS NOTES
Asymptomatic 12 beats of NSVT noted.EMR reviewed and cardiology team involved already and waiting for bed to transfer to [facility].Continue bb , monitor , replace mag

## 2022-10-26 NOTE — PLAN OF CARE
Goal Outcome Evaluation:    A&O4. SBA. Cardiac diet. Heparin infusing. Denies chest pain. TELE: SR w/ inverted T waves. Plan: waiting transfer to [facility].

## 2022-10-26 NOTE — PROGRESS NOTES
[facility]    Hospitalist Progress Note  Name: Tom Rosario    MRN: [id number]  Provider:  Ricky Garcia DO  Date of Service: 10/26/2022    Summary of Stay: Tom Rosario is a 47 year old male with an unremarkable medical history who was admitted on 10/21/2022 with shortness of breath and chest heaviness.  In the emergency department, the patient was found to have a blood pressure of 124/105, heart rate 101, temperature 97.6  F, respiratory rate 18, SPO2 99% on room air.  Initial lab work showed BUN/creatinine 20.5/1.18, platelet 509, proBNP 2732, procalcitonin 0.06, high-sensitivity troponin 177 that improved to 142 upon recheck.  The patient tested positive for COVID-19 on 10/21.  The patient is unvaccinated.  Chest x-ray showed mild right perihilar predominant pulmonary opacities possibly representing infection versus atelectasis.  EKG showed sinus rhythm with possible left atrial enlargement and T wave inversions in the lateral leads.  The patient was started on a heparin drip.  Cardiology was consulted to see the patient.  Overnight on 10/22, the patient did have recurrent chest pain improved with sublingual nitroglycerin.  The patient's echocardiogram returned showing EF 15 to 20%, grade 3 diastolic dysfunction, severely reduced left ventricular systolic function, apical left ventricular thrombus versus tumor.  With the new echo findings, cardiology recommended transfer to the [facility].  This was discussed with the patient who was agreeable.    TODAY'S PLAN:  Contacted transfer line.  They are waiting for discharges today and were hopeful pt would be transferred this afternoon/evening.  Appreciate Cardiology recommendations.    Problem List:   Chest Pain  Dyspnea on Exertion  NSTEMI  - Improved with nitroglycerin SL  - Appreciate Cardiology recommendations - recommending transfer to [facility].  Pt is agreeable.  Pt accepted at the [facility], now waiting for available bed.  -  Continue heparin drip  - Continue ASA and metoprolol  - Nitroglycerin SL prn  - Echocardiogram showed EF 15 to 20%, grade 3 diastolic dysfunction, severely reduced left ventricular systolic function, apical left ventricular thrombus versus tumor  - Telemetry     Paroxysmal Atrial Flutter  - Continue metoprolol  - Continue heparin drip     Covid 19 Infection  - Symptom Onset approximately 10/8  - Pt is unvaccinated  - Received Azithromycin and Amoxicillin on 10/11 for 10 days    DVT Prophylaxis: Heparin drip  Code Status: Full Code  Diet: Low Saturated Fat Na <2400 mg    Duenas Catheter: Not present  Disposition: Expected transfer to [facility] pending bed availability.  Family updated today: No     Interval History   Pt seen and examined.  Pt denies cp, sob.  States he is sleeping well.      -Data reviewed today: I personally reviewed all new labs and imaging results over the last 24 hours.     Physical Exam   Temp: 97  F (36.1  C) Temp src: Oral BP: 116/88 Pulse: 88   Resp: 18 SpO2: 96 % O2 Device: None (Room air)    Vitals:    10/24/22 0620 10/25/22 0534 10/26/22 0622   Weight: 78.5 kg (173 lb) 78.4 kg (172 lb 14.4 oz) 78.3 kg (172 lb 11.2 oz)     Vital Signs with Ranges  Temp:  [97  F (36.1  C)-98.6  F (37  C)] 97  F (36.1  C)  Pulse:  [77-88] 88  Resp:  [18] 18  BP: (107-116)/(73-88) 116/88  SpO2:  [96 %-98 %] 96 %  I/O last 3 completed shifts:  In: 720 [P.O.:720]  Out: -     GENERAL: No apparent distress. Awake, alert, and fully oriented.  HEENT: Normocephalic, atraumatic. Extraocular movements intact.  CARDIOVASCULAR: Regular rate and rhythm without murmurs or rubs. No S3.  PULMONARY: Clear bilaterally.  GASTROINTESTINAL: Soft, non-tender, non-distended. Bowel sounds normoactive.   EXTREMITIES: No cyanosis or clubbing. No edema.  NEUROLOGICAL: CN 2-12 grossly intact, no focal neurological deficits.  DERMATOLOGICAL: No rash, ulcer, bruising, nor jaundice.    Medications     heparin 1,410 Units/hr (10/26/22 0942)      - MEDICATION INSTRUCTIONS -         aspirin  81 mg Oral Daily     losartan  12.5 mg Oral Daily     metoprolol succinate ER  25 mg Oral BID     sodium chloride (PF)  3 mL Intracatheter Q8H     Data     Laboratory:  Recent Labs   Lab 10/25/22  0626 10/24/22  0650 10/21/22  1751   WBC 4.5 5.3 5.3   HGB 12.7* 13.5 13.1*   HCT 40.5 42.4 42.1   MCV 94 93 96    461* 483*     Recent Labs   Lab 10/26/22  0643 10/25/22  0626 10/24/22  0650 10/23/22  0636 10/23/22  0617 10/22/22  0633 10/21/22  1524   NA  --  139 138  --   --   --  140   POTASSIUM 3.9 3.8 4.0   < >  --    < > 4.8   CHLORIDE  --  105 104  --   --   --  107   CO2  --  24 23  --   --   --  24   ANIONGAP  --  10 11  --   --   --  9   GLC  --  101* 103*  --  82   < > 91   BUN  --  13.6 16.6  --   --   --  20.5*   CR  --  1.23* 1.31*  --   --   --  1.18*   GFRESTIMATED  --  72 67  --   --   --  76   CHARLIE  --  8.6 8.7  --   --   --  8.9    < > = values in this interval not displayed.     No results for input(s): CULT in the last 168 hours.    Imaging:  No results found for this or any previous visit (from the past 24 hour(s)).      Ricky Garcia DO  [facility] Hospitalist  [address] [place] [address]  10/26/2022

## 2022-10-26 NOTE — PLAN OF CARE
Goal Outcome Evaluation:      Plan of Care Reviewed With: patient, significant other    Overall Patient Progress: no change    Outcome Evaluation: [facility]wait[facility]     A&Ox4. VSS on room air. Denies CP, SOB. Mg replaced per protocol. Tele shows SR w/ inverted T waves. Heparin infusing @ 1410 units/hr. Pt had 14 beats nonsustained V tach today. [facility] .

## 2022-10-26 NOTE — PROGRESS NOTES
"Cardiology Progress Note     Seen by Dr Hendrickson       Assessment and Plan:     Admit 10/21/22 with few week hx of progressive chest heaviness, SOB, cough. Tested positive for COVID 10/11/22 at urgent care and completed antibiotics for pneumonia.  Evidence here of respiratory distress, new severe biventricular dysfunction, LV thrombus, troponin elevation.    No prior medical history    New Severe Biventricular Dysfunction, etiology unclear  -NT pro BNP: 2700 / no pleural effusions  -LVEF 15-20% (global), LVIDd 7.0 cm, grade III diastolic dysfunction, severe RV dysfunction, 1-2+ MR, RVSP mmHg  -weight down 6 lbs from admit (has not received any diuretics)  -tolerating metoprolol XL 25 mg BID, losartan 12.5 mg  -Plan for transfer to [facility] for further workup, cMRI and likely cath to help determine etiology      NSTEMI  -no prior hx of CAD  -troponin peak 177 on admit and trended down / lateral T-wave inversion  -possible MI a few weeks ago  -No CP overnight  -ASA, beta-blocker, IV heparin      Episodic Rapid Narrow Complex Tachycardia, likely Aflutter  -lasted 1 hr (10/24/22)  -asymptomatic, hemodynamically stable  -in SR since  -On metoprolol   -On IV heparin      LV apical Thrombus  -on IV heparin      Nonsustained VT  -asymptomatic  -Continue BB  -K 3.9, Mg 1.8.  Keep K >4.0 and Mg > 2.0      COVID Infection      Prolonged QTc                   Interval History:     Denies any chest pain    Resolved SOB    No palpitations, specifically none this AM during NSVT run                  Medications:       aspirin  81 mg Oral Daily     losartan  12.5 mg Oral Daily     metoprolol succinate ER  25 mg Oral BID     sodium chloride (PF)  3 mL Intracatheter Q8H            Physical Exam:   Blood pressure 116/88, pulse 88, temperature 97  F (36.1  C), temperature source Oral, resp. rate 18, height 1.905 m (6' 3\"), weight 78.3 kg (172 lb 11.2 oz), SpO2 96 %.  Wt Readings from Last 3 Encounters:   10/26/22 78.3 kg (172 lb 11.2 oz) "     I/O last 3 completed shifts:  In: 720 [P.O.:720]  Out: -     CONST:  Alert and oriented  NAD  LUNGS:  CTA bilaterally  CARDIO:  RRR, S1, S2  + S3  No murmurs  ABD:  Thin +BS  EXT:  No edema  2+ DP bilaterally           Data:   TELE:  SR with TWI, prolonged QT inverval  Runs of NSVT        CBC  Recent Labs   Lab 10/25/22  0626 10/24/22  0650   WBC 4.5 5.3   HGB 12.7* 13.5    461*       BMP  Recent Labs   Lab 10/26/22  0643 10/25/22  0626 10/24/22  0650 10/23/22  0636 10/23/22  0617 10/23/22  0147 10/22/22  0633 10/21/22  1524   NA  --  139 138  --   --   --   --  140   POTASSIUM 3.9 3.8 4.0 4.1  --   --    < > 4.8   CHLORIDE  --  105 104  --   --   --   --  107   CHARLIE  --  8.6 8.7  --   --   --   --  8.9   CO2  --  24 23  --   --   --   --  24   BUN  --  13.6 16.6  --   --   --   --  20.5*   CR  --  1.23* 1.31*  --   --   --   --  1.18*   GLC  --  101* 103*  --  82 89   < > 91    < > = values in this interval not displayed.     Recent Labs   Lab Test 10/22/22  0633   CHOL 126   HDL 31*   LDL 77   TRIG 89       TROP  No results found for: TROPI, TROPONIN, TROPR    BNP  Recent Labs   Lab 10/21/22  1524   NTBNPI 2,732*

## 2022-10-26 NOTE — PLAN OF CARE
Goal Outcome Evaluation:      Plan of Care Reviewed With: patient    Overall Patient Progress: no changeOverall Patient Progress: no change    Pt. A&Ox4, up independently in room, denies any c/o pain. Tele: SR with inverted T waves. Heparin gtt infusing at 1410 units/hr, next Hep10a due in the AM. Pt. Denies any needs at this time. Will continue with POC.

## 2022-10-27 PROBLEM — I42.9 CARDIOMYOPATHY (H): Status: ACTIVE | Noted: 2022-01-01

## 2022-10-27 NOTE — PLAN OF CARE
VSS. A/Ox4. LS clear, on RA. Tele SR with inverted T waves. Pt denies CP, N/V, and shortness of breath. Heparin running at 1410 unit(s)/hr. K and mg protocol. Plan to transfer to [facility] when bed opens to have cMRI and possible cath.

## 2022-10-27 NOTE — PROCEDURES
[facility]    Procedure: *Cath without PCI    Date/Time: 10/27/2022 2:27 PM  Performed by: Abdiel Clark MD  Authorized by: Abdiel Clark MD       UNIVERSAL PROTOCOL   Site Marked: Yes  Prior Images Obtained and Reviewed:  Yes  Required items: Required blood products, implants, devices and special equipment available    Patient identity confirmed:  Verbally with patient  Patient was reevaluated immediately before administering moderate or deep sedation or anesthesia  Confirmation Checklist:  Patient's identity using two indicators  Time out: Immediately prior to the procedure a time out was called    Universal Protocol: the Joint Commission Universal Protocol was followed    Preparation: Patient was prepped and draped in usual sterile fashion       ANESTHESIA    Local Anesthetic: Lidocaine 1% without epinephrine      SEDATION  Patient Sedated: Yes    Sedation:  Fentanyl and midazolam  Vital signs: Vital signs monitored during sedation      PROCEDURE  Describe Procedure: Procedure  1) CAG    Approach RTR  Complications none  Indication acute systolic heart failure      Findings   Normal coronarires  RCA dominant    Manoles  Length of time physician/provider present for 1:1 monitoring during sedation: 15

## 2022-10-27 NOTE — H&P
[facility]    History and Physical - Hospitalist Service       Date of Admission:  10/27/2022    Assessment & Plan   Tom Rosario is a 47 year old generally healthy male who was admitted to [facility] 10/21/22-10/27/22 for SOB and chest heaviness, workup revealed NSTEMI, +COVID-19 infection, and severe cardiomyopathy and LV thrombus or tumor. Transferred to [facility] on 10/27/2022 for cardiac MRI for further workup. Of note, initially was awaiting bed at [facility] x5 days however no beds were available, cardiology approved transfer to [facility].     New severe biventricular dysfunction - unknown etiology   Severe cardiomyopathy HFrEF - EF 15-20%  Found to have severe biventricular dysfunction and seen by cardiology. Weight reportedly down 7# at [facility] from admission, did not receive diuretics. Started on beta blocker, ARB. Taken to cath lab on 10/27 at [facility] and found to have clean coronaries with 0% disease throughout, nonischemic dilated cardiomyopathy with EVEF 20%, normal RCA dominant coronary arteries. Cardiology recommended txr to [facility] for cardiac MRI. Appears euvolemic on admission to [facility], no hypoxia, well appearing without chest pain, SOB, palpitations, N/V, lightheadedness, or dizziness. Reports -180#, last weight at [facility] reportedly 171#.  - Heart center admission  - Telemetry  - Cardiology consultation  - Tentative plan for cardiac MRI at 1pm 10/28  - Continue beta blocker and ARB, adjust cardiac medications per cardiology  - Intake/Output, daily standing weights  - May need ICD prior to discharge vs LifeVest    Wt Readings from Last 4 Encounters:   10/27/22 77.8 kg (171 lb 9.6 oz)       Apical LV thrombus vs tumor  Noted on Echocardiogram at [facility]. Cause of CM unknown. Presence of apical thrombus suggests more chronic process per cardiology.  - Continue IV heparin high intensity  - Further evaluation with cardiac MRI    Non ST elevation  myocardial infarction (NSTEMI)  Presented with chest pain and ZULETA to [facility] after recent COVID-19 infection and treated subsequently for bacterial pneumonia. EKG revealed NSR with possible LAE and TWI in lateral leads. Troponin peaked at 177 and downtrended. ProBNP in the ED 2,732.  FLP: , HDL 31 L, LDL 77, TG 89. HbA1c 5.5%.   CXR showed mild right perihilar predominant pulmonary opacities possibly representing infection vs atelectasis. Procalcitonin low risk at 0.06. No pleural effusions or pulmonary edema on CXR. Of note, recent CXR 10/11 at urgent care showed enlarged cardiac silhousette.   Symptoms improved with SL nitroglycerin. He was started on heparin gtt, ASA, and beta blockade.   * ECHO revealed EF 15-20%, grade 3 diastolic dysfunction, severely reduced LV systolic function, apical left ventricular thrombus vs tumor.   * Risk stratification: No personal cardiac history, gender, age, smoking history, mother possibly had heart related issues and  of cancer, no one else with CAD or MI.  - Telemetry  - Trend troponins  - Continue aspirin 81mg/d as started at [facility]  - Heparin gtt high intensity due to apical thrombus  - BB, ARB with hold parameters  - PRN IV labetalol/hydralazine for SBP >180  - NPO after midnight    Short runs of ventricular tachycardia  Paroxysmal narrow complex tachycardia, likely atrial flutter, new finding  Prolonged QTc  Noted to have episodic rapid narrow complex tachycardia, lasting 1 hour on 10/24/22. Asymptomatic and hemodynamically stable. Rate controlled with metoprolol and anticoagulated with heparin gtt as started at [facility].  - Telemetry  - Continue beta blocker with hold parameters  - Continue heparin gtt as above  - PRN IV metoprolol for sustained heart rate >120  - Monitor K+/Mg++, replace PRN    Confirmed COVID-19 infection      Symptom Onset end of september   Date of 1st Positive Test +Home test end of 2022   Vaccination Status Unvaccinated       Reports he  initially tested positive on a home test end of September.  He presented to urgent care in 10/11 for symptoms and was started on abx for pneumonia at that time.  He has recovered and is no current symptoms including cough, fevers, chills, or shortness of breath.  - COVID-19 special precautions until ok to remove from infection prevention. Unfortunately no documented test I can find until admission to [facility] on 10/21/22. Does report in UC note 10/11/22 that patient had reported COVID+ x2 weeks prior.    Chronic kidney disease, stage 2  Baseline Cr unclear. On admission to [facility], 1.18. Has ranged 1.2-1.3 range at [facility]. May be chronic CKD vs newer onset in the setting of above  - Avoid nephrotoxins - contrast, NSAIDs  - Renally dose medications as able/needed  - Monitor    Mild normocytic anemia  Hgb ranging 12-13 range in setting of acute illness.  - Monitor    Recent Labs   Lab 10/27/22  0530 10/25/22  0626 10/24/22  0650 10/21/22  1751 10/21/22  1524   HGB 12.8* 12.7* 13.5 13.1* 13.5       Other Resolved [facility] problems  Thrombocytosis  Hypomagnesemia       Diet: Combination Diet Low Saturated Fat Na <2400mg Diet, No Caffeine Diet  NPO per Anesthesia Guidelines for Procedure/Surgery Except for: Meds  DVT Prophylaxis: Heparin gtt  Duenas Catheter: Not present  Central Lines: None  Cardiac Monitoring: ACTIVE order. Indication: Acute decompensated heart failure (48 hours)  Code Status: Full Code - confirmed on admission    Clinically Significant Risk Factors Present on Admission                   # Chronic systolic heart failure: echo within the past year with EF <40%             Disposition Plan      Expected Discharge Date: 10/29/2022                The patient's care was discussed with the Attending Physician, Dr. Saldaña, Bedside Nurse and Patient.    Fiordaliza Crawley PA-C  Hospitalist Service  [facility]  Securely message with the Vocera Web Console (learn more here)  Text  page via [facility] Paging/Directory         ______________________________________________________________________    Chief Complaint   Chest pain, now resolved    History is obtained from the patient, electronic health record and [facility] Hospitalist    History of Present Illness   Tom Rosario is a 47 year old male with an unremarkable medical history who was admitted to [facility] on 10/21/2022 with shortness of breath and chest heaviness.  In the emergency department, the patient was found to have a blood pressure of 124/105, heart rate 101, temperature 97.6  F, respiratory rate 18, SPO2 99% on room air.  Initial lab work showed BUN/creatinine 20.5/1.18, platelet 509, proBNP 2732, procalcitonin 0.06, high-sensitivity troponin 177 that improved to 142 upon recheck.  The patient tested positive for COVID-19 on 10/21 however reportedly + on home test end of September.  The patient is unvaccinated. He had been seen in urgent care on 10/11/22 and treated for bacterial pneumonia. Of note, at that time imaging revealed enlarged cardiac silhouette. In the ED, CXR showed mild right perihilar predominant pulmonary opacities possibly representing infection versus atelectasis.  EKG showed sinus rhythm with possible left atrial enlargement and T wave inversions in the lateral leads.  The patient was started on a heparin drip.  Cardiology was consulted.  Overnight on 10/22, the patient did have recurrent chest pain improved with sublingual nitroglycerin.  The patient's echocardiogram returned showing EF 15 to 20%, grade 3 diastolic dysfunction, severely reduced left ventricular systolic function, apical left ventricular thrombus versus tumor.  With the new echo findings, cardiology recommended transfer to [facility] for cardiac MRI and coronary angiogram. However after waiting 5 days he had an angiogram at [facility] which which revealed clean coronaries. He was transferred to [facility] on 10/27 for cardiac MRI and further evaluation of  etiology of cardiomyopathy and apical LV thrombus.    Appears euvolemic on admission to [facility], no hypoxia, well appearing without chest pain, SOB, palpitations, N/V, lightheadedness, or dizziness. Reports -180#, last weight at [facility] reportedly 171#.    Review of Systems    The 10 point Review of Systems is negative other than noted in the HPI or here.     Past Medical History    I have reviewed this patient's medical history and updated it with pertinent information if needed.   No past medical history on file.    Past Surgical History   I have reviewed this patient's surgical history and updated it with pertinent information if needed.  Past Surgical History:   Procedure Laterality Date     CV CORONARY ANGIOGRAM N/A 10/27/2022    Procedure: Coronary Angiogram;  Surgeon: Abdiel Clark MD;  Location:  HEART CARDIAC CATH LAB       Social History   I have reviewed this patient's social history and updated it with pertinent information if needed.  Social History     Tobacco Use     Smoking status: Former     Packs/day: 0.50     Years: 10.00     Pack years: 5.00     Types: Cigarettes     Smokeless tobacco: Never   Substance Use Topics     Alcohol use: Not Currently     Drug use: Not Currently       Family History   He believes his mother  of cancer and possibly heart related issues during that time.  He thinks he has multiple family members with diabetes.  He is not aware of anyone else that had known coronary disease.    Prior to Admission Medications   None     Allergies   No Known Allergies    Physical Exam   Vital Signs: Temp: 98  F (36.7  C) Temp src: Oral BP: 110/84 Pulse: 74   Resp: 16 SpO2: 96 % O2 Device: None (Room air)    Weight: 0 lbs 0 oz    General: Awake, alert, middle aged man who appears stated age. Looks comfortable sitting up in bed. No acute distress.  HEENT: Normocephalic, atraumatic. Extraocular movements intact.   Respiratory: Clear to auscultation bilaterally, no rales,  wheezing, or rhonchi.  Cardiovascular: Regular rate and rhythm, +S1 and S2, no murmur auscultated. No peripheral edema.   Gastrointestinal: Soft, non-tender, non-distended. Bowel sounds present.  Skin: Warm, dry. No obvious rashes or lesions on exposed skin. Dorsalis pedis pulses palpable bilaterally.  Musculoskeletal: No joint swelling, erythema or tenderness. Moves all extremities equally.  Neurologic: AAO x3. Cranial nerves 2-12 grossly intact, normal strength and sensation.  Psychiatric: Appropriate mood and affect. No obvious anxiety or depression.    Data   Data reviewed today: I reviewed all medications, new labs and imaging results over the last 24 hours. I personally reviewed no images or EKG's today.    Recent Labs   Lab 10/27/22  0530 10/26/22  0643 10/25/22  0626 10/24/22  0650 10/21/22  1751 10/21/22  1524   WBC 3.8*  --  4.5 5.3   < > 5.4   HGB 12.8*  --  12.7* 13.5   < > 13.5   MCV 95  --  94 93   < > 97     --  411 461*   < > 509*   INR  --   --   --   --   --  1.16*     --  139 138  --  140   POTASSIUM 4.5 3.9 3.8 4.0   < > 4.8   CHLORIDE 106  --  105 104  --  107   CO2 23  --  24 23  --  24   BUN 10.8  --  13.6 16.6  --  20.5*   CR 1.22*  --  1.23* 1.31*  --  1.18*   ANIONGAP 10  --  10 11  --  9   CHARLIE 8.7  --  8.6 8.7  --  8.9   GLC 92  --  101* 103*   < > 91    < > = values in this interval not displayed.     Recent Results (from the past 24 hour(s))   Cardiac Catheterization    Narrative    Nonischemic DCM LVEF 20%  Normal RCA dominant coronary arteries  LVT on TTE

## 2022-10-27 NOTE — PROGRESS NOTES
RN giving report: Michelle Sanchez RN    RN receiving report:  ____ ____ RN at bedside upon return to room 310    S:  Procedure performed: angiogram only.  No intervention    B:  Why procedure needed: chest pressure, SOB, poor EF    A:  Assessment of area: Right radial access.  TR band at 8mL air at 1430    R:  Recommendations: see post procedure orders.  Anticipate bedrest x 1 hour; start to release air from TR band in 60 minutes      Family not updated per patient request.  He himself will update.  Michelle Sanchez, RN

## 2022-10-27 NOTE — PRE-PROCEDURE
GENERAL PRE-PROCEDURE:   Procedure:  CAG  Date/Time:  10/27/2022 1:52 PM    Verbal consent obtained?: Yes    Written consent obtained?: Yes    Risks and benefits: Risks, benefits and alternatives were discussed    DC Plan: Appropriate discharge home plan in place for patients who are going home after procedure   Consent given by:  Patient  Patient states understanding of procedure being performed: Yes    Patient's understanding of procedure matches consent: Yes    Procedure consent matches procedure scheduled: Yes    Expected level of sedation:  Moderate  Appropriately NPO:  Yes  ASA Class:  2  Mallampati  :  Grade 2- soft palate, base of uvula, tonsillar pillars, and portion of posterior pharyngeal wall visible  Lungs:  Lungs clear with good breath sounds bilaterally  Heart:  Normal heart sounds and rate  History & Physical reviewed:  History and physical reviewed and no updates needed  I have examined the patient, reviewed the history, medications and pre procedural tests.He has severe biventricular dysfunction, LVT and VT. Diagnostic CAG requested.  I have explained to the patient the risks of death, MI, stroke in regards to  coronary angiography The patient voiced understanding and wishes to proceed. The patient has a good right radial pulse, normal ulnar pulse and a normal John's sign.

## 2022-10-27 NOTE — PLAN OF CARE
"/89 (BP Location: Left arm)   Pulse 81   Temp 97.7  F (36.5  C) (Oral)   Resp 18   Ht 1.905 m (6' 3\")   Wt 77.8 kg (171 lb 9.6 oz)   SpO2 95%   BMI 21.45 kg/m      VSS on RA, PT denies CP, SoB, pain. PT does endorse some Rico. PT had angiogram this afternoon, no coronary occlusion, EF 20%. TR band removed @1630, CMS intact, no signs of bleeding/hematoma. Plan to transfer PT to [facility] @1800. Will continue to monitor.   "

## 2022-10-27 NOTE — PROGRESS NOTES
"Cardiology Progress Note     Seen initially by Dr Hendrickson       Assessment and Plan:     Admit 10/21/22 with few week hx of progressive chest heaviness, SOB, cough. Tested positive for COVID 10/11/22 at urgent care and completed antibiotics for pneumonia.  Evidence here of respiratory distress, new severe biventricular dysfunction, LV thrombus, troponin elevation.      New Severe Biventricular Dysfunction, etiology unclear  -NT pro BNP: 2700 / no pleural effusions  -LVEF 15-20% (global), LVIDd 7.0 cm, grade III diastolic dysfunction, severe RV dysfunction, 1-2+ MR, RVSP mmHg  -weight down 7 lbs from admit (has not received any diuretics)  -tolerating metoprolol XL 25 mg BID, losartan 12.5 mg  -Plan for transfer to [facility] or [facility] for further workup, cMRI and likely cath to help determine etiology      NSTEMI  -no prior hx of CAD  -troponin peak 177 on admit and trended down / lateral T-wave inversion  -possible MI a few weeks ago  -No CP overnight  -ASA, beta-blocker, IV heparin      Episodic Rapid Narrow Complex Tachycardia, likely Aflutter  -lasted 1 hr (10/24/22)  -asymptomatic, hemodynamically stable  -in SR since  -On metoprolol   -On IV heparin      LV apical Thrombus  -on IV heparin  -Further eval with [facility]      Nonsustained VT  -asymptomatic  -Continue BB  -Keep K >4.0 and Mg > 2.0      COVID Infection      Prolonged QTc                   Interval History:     Denies any chest pain    Resolved SOB    No palpitations       We are still awaiting transfer to the .  We will also place him on transfer list to [facility]                    Medications:       aspirin  81 mg Oral Daily     losartan  12.5 mg Oral Daily     metoprolol succinate ER  25 mg Oral BID     sodium chloride (PF)  3 mL Intracatheter Q8H            Physical Exam:   Blood pressure 103/81, pulse 79, temperature 97.8  F (36.6  C), temperature source Oral, resp. rate 18, height 1.905 m (6' 3\"), weight 77.8 kg (171 lb 9.6 oz), SpO2 98 %.  Wt " Readings from Last 3 Encounters:   10/27/22 77.8 kg (171 lb 9.6 oz)     I/O last 3 completed shifts:  In: 720 [P.O.:720]  Out: -     CONST:  Alert and oriented  NAD  LUNGS:  CTA bilaterally  CARDIO:  RRR, S1, S2  + S3  No murmurs  ABD:  Thin +BS  EXT:  No edema  2+ DP bilaterally           Data:   TELE:  SR with TWI, prolonged QT inverval  Runs of NSVT, none since yesterday 10/26/22        CBC  Recent Labs   Lab 10/27/22  0530 10/25/22  0626   WBC 3.8* 4.5   HGB 12.8* 12.7*    411       BMP  Recent Labs   Lab 10/27/22  0530 10/26/22  0643 10/25/22  0626 10/24/22  0650 10/23/22  0636 10/23/22  0617 10/22/22  0633 10/21/22  1524     --  139 138  --   --   --  140   POTASSIUM 4.5 3.9 3.8 4.0   < >  --    < > 4.8   CHLORIDE 106  --  105 104  --   --   --  107   CHARLIE 8.7  --  8.6 8.7  --   --   --  8.9   CO2 23  --  24 23  --   --   --  24   BUN 10.8  --  13.6 16.6  --   --   --  20.5*   CR 1.22*  --  1.23* 1.31*  --   --   --  1.18*   GLC 92  --  101* 103*  --  82   < > 91    < > = values in this interval not displayed.     Recent Labs   Lab Test 10/22/22  0633   CHOL 126   HDL 31*   LDL 77   TRIG 89       TROP  No results found for: TROPI, TROPONIN, TROPR    BNP  Recent Labs   Lab 10/21/22  1524   NTBNPI 2,732*

## 2022-10-27 NOTE — PROGRESS NOTES
[facility]    Hospitalist Progress Note  Name: Tom Rosario    MRN: [id number]  Provider:  Ricky Garcia DO  Date of Service: 10/27/2022    Summary of Stay: Tom Rosario is a 47 year old male with an unremarkable medical history who was admitted on 10/21/2022 with shortness of breath and chest heaviness.  In the emergency department, the patient was found to have a blood pressure of 124/105, heart rate 101, temperature 97.6  F, respiratory rate 18, SPO2 99% on room air.  Initial lab work showed BUN/creatinine 20.5/1.18, platelet 509, proBNP 2732, procalcitonin 0.06, high-sensitivity troponin 177 that improved to 142 upon recheck.  The patient tested positive for COVID-19 on 10/21.  The patient is unvaccinated.  Chest x-ray showed mild right perihilar predominant pulmonary opacities possibly representing infection versus atelectasis.  EKG showed sinus rhythm with possible left atrial enlargement and T wave inversions in the lateral leads.  The patient was started on a heparin drip.  Cardiology was consulted to see the patient.  Overnight on 10/22, the patient did have recurrent chest pain improved with sublingual nitroglycerin.  The patient's echocardiogram returned showing EF 15 to 20%, grade 3 diastolic dysfunction, severely reduced left ventricular systolic function, apical left ventricular thrombus versus tumor.  With the new echo findings, cardiology recommended transfer to the [facility].  This was discussed with the patient who was agreeable.  Transfer to the [facility] was complicated by lack of available beds.  On 10/27, Cardiology cleared to patient for transfer to [facility].  The patient was accepted at [facility] and was agreeable to transfer.    TODAY'S PLAN:  Appreciate Cardiology recommendations.  Cardio ok with transfer to [facility].  Discussed with pt who is agreeable.  Contacted transfer line.  Pt accepted at [facility].  Likely transfer this afternoon.  Pt remains  asymptomatic from cardiac and covid standpoint.      Problem List:   Chest Pain  Dyspnea on Exertion  NSTEMI  - Improved with nitroglycerin SL  - Appreciate Cardiology recommendations - recommending transfer to [facility].  Pt is agreeable.  Pt accepted at the [facility], now waiting for available bed.  - Continue heparin drip  - Continue ASA and metoprolol  - Nitroglycerin SL prn  - Echocardiogram showed EF 15 to 20%, grade 3 diastolic dysfunction, severely reduced left ventricular systolic function, apical left ventricular thrombus versus tumor  - Telemetry     Paroxysmal Atrial Flutter  - Continue metoprolol  - Continue heparin drip     Covid 19 Infection  - Symptom Onset approximately 10/8  - Pt is unvaccinated  - Received Azithromycin and Amoxicillin on 10/11 for 10 days    DVT Prophylaxis: Heparin drip  Code Status: Full Code  Diet: Low Saturated Fat Na <2400 mg    Duenas Catheter: Not present  Disposition: Expected discharge to [facility] once bed available.  Family updated today: No     Interval History   Pt seen and examined.  Pt denies cp, sob.    -Data reviewed today: I personally reviewed all new labs and imaging results over the last 24 hours.     Physical Exam   Temp: 98.1  F (36.7  C) Temp src: Oral BP: 112/89 Pulse: 85   Resp: 15 SpO2: 99 % O2 Device: None (Room air)    Vitals:    10/25/22 0534 10/26/22 0622 10/27/22 0427   Weight: 78.4 kg (172 lb 14.4 oz) 78.3 kg (172 lb 11.2 oz) 77.8 kg (171 lb 9.6 oz)     Vital Signs with Ranges  Temp:  [97.8  F (36.6  C)-98.1  F (36.7  C)] 98.1  F (36.7  C)  Pulse:  [79-99] 85  Resp:  [15-20] 15  BP: (100-117)/() 112/89  SpO2:  [97 %-99 %] 99 %  I/O last 3 completed shifts:  In: 720 [P.O.:720]  Out: -     GENERAL: No apparent distress. Awake, alert, and fully oriented.  HEENT: Normocephalic, atraumatic. Extraocular movements intact.  CARDIOVASCULAR: Regular rate and rhythm without murmurs or rubs. No S3.  PULMONARY: Clear bilaterally.  GASTROINTESTINAL: Soft,  non-tender, non-distended. Bowel sounds normoactive.   EXTREMITIES: No cyanosis or clubbing. No edema.  NEUROLOGICAL: CN 2-12 grossly intact, no focal neurological deficits.  DERMATOLOGICAL: No rash, ulcer, bruising, nor jaundice.    Medications     heparin 1,410 Units/hr (10/27/22 0416)     - MEDICATION INSTRUCTIONS -         aspirin  81 mg Oral Daily     losartan  12.5 mg Oral Daily     metoprolol succinate ER  25 mg Oral BID     sodium chloride (PF)  3 mL Intracatheter Q8H     Data     Laboratory:  Recent Labs   Lab 10/27/22  0530 10/25/22  0626 10/24/22  0650   WBC 3.8* 4.5 5.3   HGB 12.8* 12.7* 13.5   HCT 41.1 40.5 42.4   MCV 95 94 93    411 461*     Recent Labs   Lab 10/27/22  0530 10/26/22  0643 10/25/22  0626 10/24/22  0650     --  139 138   POTASSIUM 4.5 3.9 3.8 4.0   CHLORIDE 106  --  105 104   CO2 23  --  24 23   ANIONGAP 10  --  10 11   GLC 92  --  101* 103*   BUN 10.8  --  13.6 16.6   CR 1.22*  --  1.23* 1.31*   GFRESTIMATED 73  --  72 67   CHARLIE 8.7  --  8.6 8.7     No results for input(s): CULT in the last 168 hours.    Imaging:  No results found for this or any previous visit (from the past 24 hour(s)).      Ricky Garcia DO  [facility] Hospitalist  [address] [place] [place] [address]  10/27/2022

## 2022-10-27 NOTE — DISCHARGE SUMMARY
"Hospitalist Discharge Summary  [facility]    Tom Rosario MRN# [id number]   YOB: 1975 Age: 47 year old     Date of Admission:  10/21/2022  Date of Discharge:  10/27/2022  Admitting Physician:  Luan Ha MD  Discharge Physician:  Ricky Garcia DO  Discharging Service:  Hospitalist     Primary Provider: No Ref-Primary, Physician          Discharge Diagnosis:     Chest Pain  Dyspnea on Exertion  NSTEMI  - Improved with nitroglycerin SL  - Appreciate Cardiology recommendations - recommending transfer to [facility].  Pt is agreeable.  Pt accepted at the [facility], now waiting for available bed.  - Continue heparin drip  - Continue ASA and metoprolol  - Nitroglycerin SL prn  - Echocardiogram showed EF 15 to 20%, grade 3 diastolic dysfunction, severely reduced left ventricular systolic function, apical left ventricular thrombus versus tumor  - Telemetry     Paroxysmal Atrial Flutter  - Continue metoprolol  - Continue heparin drip     Covid 19 Infection  - Symptom Onset approximately 10/8  - Pt is unvaccinated  - Received Azithromycin and Amoxicillin on 10/11 for 10 days             Discharge Disposition:     Transferred to [facility]           Allergies:     No Known Allergies           Discharge Medications:     There are no discharge medications for this patient.             Condition on Discharge:     Discharge condition: Fair   Discharge vitals: Blood pressure 100/76, pulse 78, temperature 97.6  F (36.4  C), temperature source Oral, resp. rate 18, height 1.905 m (6' 3\"), weight 77.8 kg (171 lb 9.6 oz), SpO2 99 %.   Code status on discharge: Full Code      BASIC PHYSICAL EXAMINATION:  GENERAL: No apparent distress.  CARDIOVASCULAR: Regular rate and rhythm without murmurs.  PULMONARY: Clear to auscultation bilaterally.   GASTROINTESTINAL: Abdomen soft, non-tender.  EXTREMITIES: No edema, pulses intact.  NEUROLOGIC: No focal deficits.            History of Illness: "   See detailed admission note for full details.               Procedures excluding imaging which is summarized below:     Please see details in the electronic medical record.           Consultations:     PHARMACY IP CONSULT  PHARMACY IP CONSULT  CARDIOLOGY IP CONSULT  PHARMACY IP CONSULT  PHARMACY IP CONSULT  SMOKING CESSATION PROGRAM IP CONSULT          Significant Results:     Results for orders placed or performed during the hospital encounter of 10/21/22   Chest XR,  PA & LAT    Narrative    CHEST TWO VIEWS    10/21/2022 3:53 PM     HISTORY: Evaluate for worsening pneumonia.    COMPARISON: None available      Impression    IMPRESSION: PA and lateral views of the chest were obtained.  Cardiomediastinal silhouette is within normal limits. Mild right  perihilar predominant pulmonary opacities, indeterminate, could  represent infection or atelectasis. No significant pleural effusion or  pneumothorax.    ALIREZA PENN MD         SYSTEM ID:  [id number]   Echocardiogram Complete     Value    LVEF  15-20%    Narrative    [id number]  [id number]  [id number]  [id number]^POLO^DIANA^MICHELE     [facility]  Echocardiography Laboratory  [address] [place] [address]     Name: COLE HARRIS  MRN: [id number]  : 1975  Study Date: 10/22/2022 09:54 AM  Age: 47 yrs  Gender: Male  Patient Location: [facility] [place]  Reason For Study: MI  Ordering Physician: DIANA CUELLAR  Performed By: Octaviano Silva RDCS     BSA: 2.1 m2  Height: 75 in  Weight: 178 lb  HR: 81  BP: 118/90 mmHg  ______________________________________________________________________________  Procedure  Complete Portable Echo Adult.  ______________________________________________________________________________  Interpretation Summary     Dilated biventricular cardiomyoppathy with severe LV and RV systolic function  EF 15-20%  Large very well organized mass in apex 2.5cm by 3.0cm, possible thrombus vs  tumor  Results communicated to ordering  physician. There is no comparison study  available.  ______________________________________________________________________________  Left Ventricle  The left ventricle is severely dilated. The visual ejection fraction is 15-  20%. Grade III or advanced diastolic dysfunction. Left ventricular systolic  function is severely reduced. There is severe global hypokinesia of the left  ventricle. There is an apical left ventricular thrombus.     Right Ventricle  The right ventricle is grossly normal size. Severely decreased right  ventricular systolic function.     Atria  The left atrium is severely dilated. The right atrium is severely dilated.  There is no color Doppler evidence of an atrial shunt.     Mitral Valve  The mitral valve is normal in structure and function. There is mild to  moderate (1-2+) mitral regurgitation. There is no mitral valve stenosis.     Tricuspid Valve  The tricuspid valve is not well visualized, but is grossly normal. There is  trace tricuspid regurgitation. The right ventricular systolic pressure is  approximated at 30.5 mmHg plus the right atrial pressure.     Aortic Valve  The aortic valve is trileaflet. No hemodynamically significant valvular aortic  stenosis.     Pulmonic Valve  The pulmonic valve is not well seen, but is grossly normal. Normal pulmonic  valve velocity.     Vessels  Normal size aorta. Dilation of the inferior vena cava is present with abnormal  respiratory variation in diameter.     Pericardium  Trivial pericardial effusion.     ______________________________________________________________________________  MMode/2D Measurements & Calculations  IVSd: 0.93 cm  LVIDd: 7.0 cm  LVIDs: 6.8 cm  LVPWd: 0.90 cm  FS: 2.3 %     LV mass(C)d: 285.8 grams  LV mass(C)dI: 136.9 grams/m2  Ao root diam: 3.0 cm  LA dimension: 5.1 cm  asc Aorta Diam: 3.0 cm  LA/Ao: 1.7  LA Volume (BP): 110.0 ml  LA Volume Index (BP): 52.6 ml/m2  RWT: 0.26     Doppler Measurements & Calculations  MV E max aneesh:  80.6 cm/sec  MV A max aneesh: 33.0 cm/sec  MV E/A: 2.4     MV dec time: 0.13 sec  PA acc time: 0.08 sec  PI end-d aneesh: 171.0 cm/sec  TR max aneesh: 276.0 cm/sec  TR max P.5 mmHg  E/E' av.8  Lateral E/e': 15.7  Medial E/e': 31.8     ______________________________________________________________________________  Report approved by: Adilia He 10/22/2022 12:45 PM         Cardiac Catheterization    Narrative    Nonischemic DCM LVEF 20%  Normal RCA dominant coronary arteries  LVT on TTE       Transthoracic Echocardiogram Results:  No results found for this or any previous visit (from the past 4320 hour(s)).             Pending Results:     Unresulted Labs Ordered in the Past 30 Days of this Admission     No orders found from 2022 to 10/22/2022.                      Discharge Instructions and Follow-Up:     Discharge instructions and follow-up:   Discharge Procedure Orders   Medication Instructions - Anticoagulants   Order Comments: Do NOT stop your aspirin or platelet inhibitor unless directed by your Cardiologist.  These medications help to prevent platelets in your blood from sticking together and forming a clot.  Examples of these medications are:  Ticagrelor (Brilinta), Clopidigrel (Plavix), Prasugrel (Effient)     When to call - Contact the Heart Clinic   Order Comments: You may experience symptoms that require follow-up before your scheduled appointment. Contact the Heart Clinic if you develop: Fever over 100.4o Fahrenheit, that lasts more than one day; Redness, heat, or pus at the puncture site; Change in color or temperature in your hand or arm.     When to call - Reasons to Call 911   Order Comments: If your wrist puncture site starts bleeding after discharge, sit down and apply firm pressure with your thumb against the puncture site and fingers against the back of the wrist for 10 minutes. If the bleeding stops, continue to rest, keeping your wrist still for 2 hours. Notify your doctor as  soon as possible.  IF BLEEDING DOES NOT STOP OR THERE IS A LARGER AMOUNT OF BLEEDING OR SPURTING CALL 9-1-1 immediately.DO NOT drive yourself to the hospital.     Precautions - Lifting   Order Comments: DO NOT lift more than 5 pounds with affected arm for 48 hours     Precautions - Household Activities   Order Comments: Avoid excessive bending or movement of your wrist for 72 hours.  Do not subject hand/arm to any forceful movements for 24 hours, such as supporting weight when rising from a chair or bed.     Remove the band-aid on the puncture site after 24 hours and leave open to air. If minor oozing, you may apply a band-aid and remove after 12 hours.     Precautions - Active Sports Activities   Order Comments: DO NOT engage in vigorous exercise using your affected arm for 3 days after discharge.  This includes golf, tennis or swimming.     Precautions - Operating yard equipment or vehicles   Order Comments: Do not operate a chainsaw, lawnmower, motorcycle, or all-terrain vehicle for 48 hours after the procedure.     Precautions - Elective Dental Work   Order Comments: NO elective dental work for 6 weeks after receiving a stent.     Comfort and Pain Management - Bruising after Surgery   Order Comments: Expect mild tingling of hand and tenderness at the wrist puncture site for up to 3 days. You may take Tylenol or a pain medicine recommended by your doctor.     Activity - Cardiac Rehab   Order Comments: You are encouraged to enroll in an Outpatient Cardiac Rehab program after discharge from the hospital.  Our Cardiac Rehab staff may visit briefly with you while you're in the hospital.  If they miss you, someone will contact you after you are home.     Return to Driving   Order Comments: Driving is NOT permitted for 24 hours after surgery     Return to work   Order Comments: You may return to work after 72 hours if you are feeling well and your job does not involve heavy lifting.     Shower / Bathing   Order  Comments: You may shower on the day after your procedure.  DO NOT soak of wrist with the puncture site in water for 3 days to prevent infection. DO NOT take a tub bath or wash dishes for 3 days after the procedure     Dressing Removal   Order Comments: Remove the band-aid on the puncture site after 24 hours and leave open to air. If minor oozing, you may apply a band-aid and remove after 12 hours             Hospital Course:     Tom Rosario is a 47 year old male with an unremarkable medical history who was admitted on 10/21/2022 with shortness of breath and chest heaviness.  In the emergency department, the patient was found to have a blood pressure of 124/105, heart rate 101, temperature 97.6  F, respiratory rate 18, SPO2 99% on room air.  Initial lab work showed BUN/creatinine 20.5/1.18, platelet 509, proBNP 2732, procalcitonin 0.06, high-sensitivity troponin 177 that improved to 142 upon recheck.  The patient tested positive for COVID-19 on 10/21.  The patient is unvaccinated.  Chest x-ray showed mild right perihilar predominant pulmonary opacities possibly representing infection versus atelectasis.  EKG showed sinus rhythm with possible left atrial enlargement and T wave inversions in the lateral leads.  The patient was started on a heparin drip.  Cardiology was consulted to see the patient.  Overnight on 10/22, the patient did have recurrent chest pain improved with sublingual nitroglycerin.  The patient's echocardiogram returned showing EF 15 to 20%, grade 3 diastolic dysfunction, severely reduced left ventricular systolic function, apical left ventricular thrombus versus tumor.  With the new echo findings, cardiology recommended transfer to the [facility].  This was discussed with the patient who was agreeable.  Transfer to the [facility] was complicated by lack of available beds.  On 10/27, Cardiology cleared to patient for transfer to [facility].  The patient was accepted at [facility] and was  agreeable to transfer.    The patient was seen, examined, and counseled on this day. The hospitalization and plan of care was reviewed with the patient extensively. All questions were addressed and the patient agreed to follow-up as noted above.      Total time spent in face to face contact with the patient and coordinating discharge was:  33 Minutes    Ricky Garcia DO  [facility] Hospitalist  [address] [place] [address]  10/27/2022

## 2022-10-27 NOTE — LETTER
October 28, 2022    RE:  Tom Rosario 1975  [address]   [facility] [address]          To whom it may concern:    This letter is to certify that Tom Rosario is unable to return to work until 12/1/22.    Should you have any questions, please call [phone].    Sincerely,          ANNIE Orourke [facility]

## 2022-10-27 NOTE — PROGRESS NOTES
Pt transferred into room 273. 2 bags of belongings accompany him. No paperwork or rhythm strips sent. Pt denies any symptoms. R wrist CDI. Heparin due for restart at this time-paging hospitalist for orders.

## 2022-10-28 NOTE — PHARMACY-ADMISSION MEDICATION HISTORY
Admission medication history completed at [facility]. Please see Pharmacy - Admission Medication History note from 10/21/2022.

## 2022-10-28 NOTE — PLAN OF CARE
Goal Outcome Evaluation:         No changes, CMRI done today. Keep in COVID precautions per Hospitalist until cleared by Infection prevention.   Stable vs, denies any pain.   Hep d/cd, now on Eliquis.   SR on the monitor.

## 2022-10-28 NOTE — PROGRESS NOTES
"[facility] Cardiology Progress Note  Date of Service: 10/28/2022  Primary Cardiologist: Will be Dr. Hendrickson    Tom Rosario is a 47 year old male with no prior medical history, who was diagnosed with and treated for COVID-19 pneumonia on , admitted to [facility] on  with progressive shortness of breath and chest heaviness, found to have patchy groundglass opacities on CT, and severely reduced biventricular dysfunction on echocardiogram with an LVEF of <20% and an LVIDd of 7 cm.  Additionally, there was an LV apical thrombus and some trabeculation possibly consistent with noncompaction.  He had a very minimal troponin rise, not consistent with acute myocarditis.  Per report, he has had short runs of nonsustained VT and an atrial arrhythmia on 10/24 which the [facility] cardiology team felt was atrial flutter; however, I reviewed his EKG with Dr. Arreaga today and he feels this is atrial tachycardia.  Cath last evening showed no coronary atherosclerosis. He was started on GDMT with BB and ARB but has not been felt to require diuretic therapy.  He was on the wait list for the [facility] however due to lack of beds, he was transferred to [facility] on 10/27/2022 for a cardiac MRI, planned for 1PM today.    Subjective: Prior dyspnea, orthopnea, and chest tightness have resolved. Intermittent palpitations \"for my whole life.\" No unprovoked history of syncope.     Assessment:  1. Severe dilated, nonischemic CMP, with an LVEF of <20% and an LVIDd of 7 cm, chronic-appearing.    -- Acute decompensation in the setting of recent COVID-19 PNA.   -- Initially EKG's demonstrated a prolonged QTc; this has resolved on EKG today.    -- Family history of a cousin (father's side) with \"enlarged heart\" who  as a teenager. He is not able to get more details on this.    -- No significant ETOH or drug use history.   2. Large LV thrombus. On heparin.  3. Paroxysmal atrial " tachycardia and NSVT.  4. Normal coronary arteries.  5. Mild-mod functional MR.  6. Hypoalbuminemia. Albumin 2.6.   7. Recently treated COVID-19 PNA (10/11).   8. Mild CKD, creat ~1.3.  9. Social - Intermittent tobacco use on/off x years. Has 6 adult children who live out of state, and one 9 yr old daughter who lives with him and his fiancee in [place] (daughter, aged 9). 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 Looking into short-term disability/FMLA.     Plan:   1. Discussed anticoagulation situation with pharmacy. They recommend starting Eliquis 10 mg BID (x1 week), then reduce to 5 mg BID. Stop heparin drip one hour after first dose of Eliquis.  2. Stop Losartan, start Entresto 24/26 mg BID. Please provide $10 copay card on discharge.  3. Continue Toprol XL 25 mg BID.  4. Start jardiance 10 mg daily.   5. Plan to add isaiah in the outpatient setting.   6. Please remove from isolation; his diagnosis of COVID-19 was well over two weeks ago. Needs to ambulate with cardiac rehab.  7. Cardiac MRI with contrast today.   8. Will need to discharge with a 30d event monitor.  9. CORE clinic consultation. Has a scale, will weigh daily.   10. Ultimate referral to the AHF team at the [facility].    Plan was formulated under direction of Dr. Raygoza.   Colleen Cueva PA-C  [facility] - [facility] Clinic  Pager: [phone]  Text Page  (7:30am - 4pm M-F)   __________________________________________________________________________  Physical Exam   Temp: 98.8  F (37.1  C) Temp src: Oral BP: (!) 120/94 Pulse: 86   Resp: 18 SpO2: 100 % O2 Device: None (Room air)    There were no vitals filed for this visit.    GENERAL:  The patient is in no apparent distress.   NECK: CVP appears normal, no masses or thyromegaly.  PULMONARY:  There is a normal respiratory effort. Clear lungs to auscultation bilaterally.   CARDIOVASCULAR:  RRR, normal S1 S2, no m/r/g.  GI:  Non tender abdomen with normoactive bowel sounds and no hepatosplenomegaly. There  are no masses palpable.   EXTREMITIES:  No clubbing, cyanosis or edema.  VASCULAR: 2+ Pulses bilaterally in upper and lower extremities.    Medications     heparin 1,550 Units/hr (10/28/22 0530)     - MEDICATION INSTRUCTIONS -         aspirin  81 mg Oral Daily     losartan  12.5 mg Oral Daily     metoprolol succinate ER  25 mg Oral BID     senna-docusate  1 tablet Oral BID    Or     senna-docusate  2 tablet Oral BID     sodium chloride (PF)  3 mL Intracatheter Q8H       Data   Most Recent 3 CBC's:Recent Labs   Lab Test 10/28/22  0442 10/27/22  0530 10/25/22  0626   WBC 4.2 3.8* 4.5   HGB 12.7* 12.8* 12.7*   MCV 92 95 94    402 411     Most Recent 3 BMP's:Recent Labs   Lab Test 10/28/22  0442 10/27/22  0530 10/26/22  0643 10/25/22  0626    139  --  139   POTASSIUM 4.3 4.5 3.9 3.8   CHLORIDE 106 106  --  105   CO2 26 23  --  24   BUN 18 10.8  --  13.6   CR 1.30* 1.22*  --  1.23*   ANIONGAP 5 10  --  10   CHARLIE 8.9 8.7  --  8.6   GLC 98 92  --  101*     Most Recent 2 LFT's:Recent Labs   Lab Test 10/28/22  0442   AST 33   ALT 66   ALKPHOS 88   BILITOTAL 1.0     Most Recent 3 BNP's:Recent Labs   Lab Test 10/21/22  1524   NTBNPI 2,732*     Most Recent TSH and T4:No lab results found.  Most Recent ESR & CRP:No lab results found.

## 2022-10-28 NOTE — CONSULTS
Patient has [employer] through an employer.    Entresto:  $120/mo. Upon receipt of RX Discharge Pharmacy can provide 1 mo free.  Patient is eligible to download a copay savings card from [employer] to reduce this to $10/mo.    Jardiance:  $25/mo. Upon receipt of RX Discharge Pharmacy can provide 14 days free.  Patient is eligible to download a copay savings card from [employer] to reduce this to $10/mo.    Farxiga: Prior auth required.    Mary Diallo  Pharmacy Technician/Liaison, Discharge Pharmacy   [phone]  [email or web address]

## 2022-10-28 NOTE — PROGRESS NOTES
[facility]    Hospitalist Progress Note      Assessment & Plan   Tom Rosario is a 47 year old generally healthy male who was admitted to [facility] 10/21/22-10/27/22 for SOB and chest heaviness, workup revealed NSTEMI, +COVID-19 infection, and severe cardiomyopathy and LV thrombus or tumor. Transferred to [facility] on 10/27/2022 for cardiac MRI for further workup.    New severe biventricular dysfunction - unknown etiology   Severe cardiomyopathy HFrEF - EF 15-20%  Apical LV thrombus   Found to have severe biventricular dysfunction and seen by cardiology. Weight reportedly down 7# at [facility] from admission, did not receive diuretics. Started on beta blocker, ARB. Taken to cath lab on 10/27 at [facility] and found to have clean coronaries with 0% disease throughout, nonischemic dilated cardiomyopathy with EVEF 20%, normal RCA dominant coronary arteries. Cardiology recommended txr to [facility] for cardiac MRI. Appears euvolemic on admission to [facility], no hypoxia, well appearing without chest pain, SOB, palpitations, N/V, lightheadedness, or dizziness. Reports -180#, last weight at [facility] reportedly 171#.  - Telemetry  1. - Cardiology consult: Eliquis 10 mg BID (x1 week), then reduce to 5 mg BID. Stop heparin drip one hour after first dose of Eliquis. Stop Losartan, start Entresto 24/26 mg BID. Provide $10 copay card on discharge.Continue Toprol XL 25 mg BID. Start jardiance 10 mg daily. Plan to add MDR in the outpatient setting.  LV function and ICD candidacy in 3 months. Follow-up in core clinic. Outpatient genetics consult.   - Cardiac MRI  10/28/2022 pending  - Intake/Output, daily standing weights        Non ST elevation myocardial infarction (NSTEMI)  Presented with chest pain and ZULETA to [facility] after recent COVID-19 infection and treated subsequently for bacterial pneumonia. EKG revealed NSR with possible LAE and TWI in lateral leads. Troponin peaked at 177  and downtrended. ProBNP in the ED 2,732.  FLP: , HDL 31 L, LDL 77, TG 89. HbA1c 5.5%.   CXR showed mild right perihilar predominant pulmonary opacities possibly representing infection vs atelectasis. Procalcitonin low risk at 0.06. No pleural effusions or pulmonary edema on CXR. Of note, recent CXR 10/11 at urgent care showed enlarged cardiac silhousette.   Symptoms improved with SL nitroglycerin. He was started on heparin gtt, ASA, and beta blockade.   * ECHO revealed EF 15-20%, grade 3 diastolic dysfunction, severely reduced LV systolic function, apical left ventricular thrombus vs tumor.   -Cor Cath/Angio cited by Cardiology:  no coronary atherosclerosis.     Short runs of ventricular tachycardia  Paroxysmal narrow complex tachycardia, likely atrial flutter, new finding  Prolonged QTc  EKG reviwed by Dr. Arreaga 10/28/2022  and he feels this is atrial tachycardia  -telemetry  -Continue Toprol XL 25 mg BID.  -discharge with 30d event monitor.  - PRN IV metoprolol for sustained heart rate >120  - Monitor K+/Mg++, replace PRN     Confirmed COVID-19 infection      Symptom Onset end of september   Date of 1st Positive Test +Home test end of Sept 2022   Vaccination Status Unvaccinated         Reports he initially tested positive on a home test end of September.  He presented to urgent care in 10/11 for symptoms and was started on abx for pneumonia at that time.  He has recovered and is no current symptoms including cough, fevers, chills, or shortness of breath.  - COVID-19 special precautions until ok to remove from infection prevention. Unfortunately no documented testuntil admission to [facility] on 10/21/22.     Chronic kidney disease, stage 2  Baseline Cr unclear. On admission to [facility], 1.18. Has ranged 1.2-1.3 range at [facility]. May be chronic CKD vs newer onset in the setting of above  - Avoid nephrotoxins - contrast, NSAIDs  - Renally dose medications as able/needed  - Monitor     Mild normocytic anemia  Hgb  ranging 12-13 range in setting of acute illness.  - Monitor             Recent Labs   Lab 10/27/22  0530 10/25/22  0626 10/24/22  0650 10/21/22  1751 10/21/22  1524   HGB 12.8* 12.7* 13.5 13.1* 13.5         Other Resolved [facility] problems  Thrombocytosis  Hypomagnesemia    DVT Prophylaxis: DUAC  Code Status: Full Code  Expected discharge, TBA given complex patient with multiple medical issues, COVID status, Cardiology plan established    Larry Main MD  Text Page (7am - 6pm, M-F)    Total unit/floor time 35 minutes:  time consisted of the following assuming Patient care in complex Patient with severe acute cardiomyopathy, LV thrombus in CCU, examination of patient, review of records including labs, imaging results, medications, interdisciplinary notes and completing documentation; > 50%Coordination of Care time with Nursing regarding cardiomyopathy, COVID status, remains in isolation pending infectious control assessment and Specialists, Cardiology regarding acute cardiomyopathy care plan, management and surveillance.     Interval History   No cp, dyspnea; RA; scheduled for cardiac MRI.     -Data reviewed today: I reviewed all new labs and imaging results over the last 24 hours.     Physical Exam   Temp: 98.8  F (37.1  C) Temp src: Oral BP: (!) 115/93 Pulse: 86   Resp: 18 SpO2: 100 % O2 Device: None (Room air)    There were no vitals filed for this visit.  Vital Signs with Ranges  Temp:  [98  F (36.7  C)-98.8  F (37.1  C)] 98.8  F (37.1  C)  Pulse:  [74-87] 86  Resp:  [16-18] 18  BP: (110-120)/(81-94) 115/93  SpO2:  [94 %-100 %] 100 %  No intake/output data recorded.    GENERAL:   NAD, alert, calm, cooperative    HEENT; atraumatic  PULMONARY:   Respirations nonlabored room air  CARDIOVASCULAR:   Regular, no murmur  GI:   Soft, nontender  EXTREMITIES:  No clubbing, cyanosis or edema.  VASCULAR: 2+ Pulses bilaterally in upper and lower extremities.    Medications     - MEDICATION INSTRUCTIONS -          apixaban ANTICOAGULANT  10 mg Oral BID     empagliflozin  10 mg Oral Daily     metoprolol succinate ER  25 mg Oral BID     sacubitril-valsartan  1 tablet Oral BID     senna-docusate  1 tablet Oral BID    Or     senna-docusate  2 tablet Oral BID     sodium chloride (PF)  10 mL Intravenous Once     sodium chloride (PF)  3 mL Intracatheter Q8H       Data   Recent Labs   Lab 10/28/22  0442 10/27/22  0530 10/26/22  0643 10/25/22  0626   WBC 4.2 3.8*  --  4.5   HGB 12.7* 12.8*  --  12.7*   MCV 92 95  --  94    402  --  411    139  --  139   POTASSIUM 4.3 4.5 3.9 3.8   CHLORIDE 106 106  --  105   CO2 26 23  --  24   BUN 18 10.8  --  13.6   CR 1.30* 1.22*  --  1.23*   ANIONGAP 5 10  --  10   CHARLIE 8.9 8.7  --  8.6   GLC 98 92  --  101*   ALBUMIN 2.6*  --   --   --    PROTTOTAL 6.1*  --   --   --    BILITOTAL 1.0  --   --   --    ALKPHOS 88  --   --   --    ALT 66  --   --   --    AST 33  --   --   --        Recent Results (from the past 24 hour(s))   MR Cardiac w contrast    Narrative                                                     [facility]                                                     CMR Report      MRN:                  [id number]                                  Name:            COLE HARRIS                                   :                  1975                                  Scan Date:   2022-10-28 13:09:33                                  Electronically signed by Ngozi Hunt 2022-Oct-28 14:51:34    SUMMARY   ==========================================================================================================    Clinical history: Severe non-ischemic cardiomyopathy, assess etiology.   Comparison CMR: None    1. The LV is severely dilated with normal wall thickness. The global systolic function is severely reduced.  The LVEF is 16%. There is severe global hypokinesis of the left ventricle.     Prominent trabeculations are noted involving the left ventricular apex and mid to  basal anterolateral wall.  These do not meet criteria for left ventricular non-compaction.     2. The RV is normal in cavity size. The global systolic function is severely reduced. The RVEF is 23%.     3. There is severe bi-atrial enlargement.     4. There is mild to moderate mitral regurgitation.     5. There is no evidence of myocardial edema on T2 weighted imaging. There is no evidence of myocardial iron  overload.     6.  There are two focal areas of late gadolinium enhancement involving the basal inferolateral wall and the  apical septal wall. These may be consistent with small prior infarcts (possibly embolic).    Given the low overall scar burden (6%) this would not be expected to account for the severity of the  patient's cardiomyopathy.     7.  Small bilateral pleural effusions are noted.     8. A large (22 mm x 13 mm) apical left ventricular thrombus is noted.  A right ventricular apical thrombus  measuring 10 mm x 7 mm is also noted.     CONCLUSIONS:     1) Severe non-ischemic cardiomyopathy with evidence of LV/RV apical thrombi as described above.    2) There are two focal areas of late gadolinium enhancement involving the basal inferolateral wall and the  apical septal wall. These may be consistent with small prior infarcts (possibly embolic).    Given the low overall scar burden (6%) this would not be expected to account for the severity of the  patient's cardiomyopathy.     CORE EXAM   ==========================================================================================================    MEASUREMENTS   ----------------------------------------------------------------------------------------      VOLUMETRIC ANALYSIS       ----------------------------------------------  .----------------------------------------------------------.                    LV    Reference   RV    Reference    +------+-----------+------+------------+------+------------+   EDV   ml          302   (046-200   173    (116-216)          ml/m^2      145   (64-99)      83   ()     ESV   ml          254   (31-76)     133   (29-89)            ml/m^2      122   (17-38)      64   (16-45)      CO    L/min      3.97              3.29                      L/min/m^2  1.90              1.58                MASS  g           209   (108-185)                            g/m^2       100   (58-91)                        SV    ml           48   ()     40   ()           ml/m^2       23   (42-66)      19   (39-71)      EF    %            16   (58-75)      23   (52-77)     '------+-----------+------+------------+------+------------'            CARDIAC OUTPUT HR:  83 BPM      LV DIMENSIONS       ----------------------------------------------          WALL THICKNESS - ANTEROSEPTAL:  1.0 cm          WALL THICKNESS - INFEROLATERAL:  0.7 cm          LV PAPI:  7.5 cm          LV ESD:  7.0 cm        EXTRACELLULAR VOLUME MEASUREMENT       ----------------------------------------------          PRE-CONTRAST T1 MYOCARDIUM:  1010 msec          POST-CONTRAST T1 MYOCARDIUM:  537 msec        IRON QUANTIFICATION       ----------------------------------------------          MYOCARDIAL T2*:  43 msec      ANATOMY   ----------------------------------------------------------------------------------------      LEFT ATRIUM       ----------------------------------------------          CAVITY SIZE:  SEVERELY ENLARGED        RIGHT ATRIUM       ----------------------------------------------          CAVITY SIZE:  SEVERELY ENLARGED        PERICARDIUM       ----------------------------------------------          EFFUSION:  Trivial      VALVES   ----------------------------------------------------------------------------------------      AORTIC VALVE       ----------------------------------------------          AORTIC VALVE LEAFLETS:  Trileaflet          AORTIC REGURGITATION:  None         MITRAL VALVE       ----------------------------------------------          MITRAL REGURGITATION:  MILD-MODERATE        TRICUSPID VALVE       ----------------------------------------------          TRICUSPID REGURGITATION:  MILD        PULMONIC VALVE       ----------------------------------------------          PULMONIC REGURGITATION:  MILD      17 SEGMENT   ----------------------------------------------------------------------------------------  .-----------------------------------------------------------------------------------------.   Segments            Wall Motion  Hyperenhancement  Stress Perfusion  Interpretation   +--------------------+-------------+------------------+------------------+----------------+   Base Anterior                                                                          Base Anteroseptal                                                                      Base Inferoseptal                                                                      Base Inferior                                                                          Base Inferolateral               26-50%                                                Base Anterolateral                                                                     Mid Anterior                                                                           Mid Anteroseptal                                                                       Mid Inferoseptal                                                                       Mid Inferior                                                                           Mid Inferolateral                                                                      Mid Anterolateral                                                                      Apical Anterior                                                                        Apical  Septal                    51-75%                                                Apical Inferior                                                                        Apical Lateral                                                                         Tangent                                                                                  +--------------------+-------------+------------------+------------------+----------------+   RV Segments         Wall Motion  Hyperenhancement                    Interpretation   +--------------------+-------------+------------------+------------------+----------------+   RV Basal Anterior                                                                      RV Basal Inferior                                                                      RV Mid                                                                                 RV Apical                                                                             '--------------------+-------------+------------------+------------------+----------------'        FINDINGS       ----------------------------------------------          LV SCAR SIZE (17 SEGMENT):  6 %      SCAN INFO   ==========================================================================================================    GENERAL   ----------------------------------------------------------------------------------------      CONTRAST AGENT       ----------------------------------------------          TYPE:  Gadavist          GD CONCENTRATION:  0.5 M          VOLUME ADMINISTERED:  10 ml          DOSAGE:  0.06 mmol/kg        VITALS       ----------------------------------------------          HEIGHT:  75.00 in          HEIGHT:  190.50 cm          WEIGHT:  177.01 lbs          WEIGHT:  80.29 kgs          BSA:  2.08 m^2        PULSE SEQUENCES       ----------------------------------------------          SSFP cine, 2D LGE  segmented, 2D LGE single-shot, T1-weighted imaging, T2-weighted imaging,          Pre-contrast T1 mapping, Post-contrast T1 mapping, T2 mapping, T2* mapping, First-pass perfusion          without stress, Bright-blood SSFP morphology         SETUP       ----------------------------------------------          SCAN TYPE:  Clinical          PATIENT TYPE:  Inpatient          INCOMPLETE SCAN:  No          REASON(S) FOR SCAN:  nonischemic CM etiology           REFERRING PHYSICIAN:  DIANA CUELLAR          ATTENDING PHYSICIAN:  PARTH BROWER   ----------------------------------------------------------------------------------------      CPT Codes      ICD10 Codes      Report generated by [employer], a product of [employer]

## 2022-10-28 NOTE — CONSULTS
Patient has [employer]) through an employer.    Eliquis:  $25/mo. Upon receipt of RX Discharge Pharmacy can provide copay savings card from [email or web address] to reduce this to $10/mo.    Xarelto:  $120/mo. Upon receipt of RX Discharge Pharmacy can provide copay savings card from [email or web address] to reduce this to $10/mo.    Mary Diallo  Pharmacy Technician/Liaison, Discharge Pharmacy   [phone]  [email or web address]

## 2022-10-28 NOTE — PLAN OF CARE
Care plan note:      Recent Vitals:  Temp: 98  F (36.7  C) Temp src: Oral BP: 110/84 Pulse: 74   Resp: 16 SpO2: 96 % O2 Device: None (Room air)      Orientation/Neuro: Alert and Oriented x 4  Pain: The patient is not having any pain.   Tele: Sinus rhythm    IV medications: Heparin   Mobility: up Independently   Skin: Radial site: R radial site- WDL   GI: WDL and no complaints  : WDL     Diet: Tolerating diet:   Well  Orders Placed This Encounter      NPO per Anesthesia Guidelines for Procedure/Surgery Except for: Meds      Safety/Concerns:  Isolation precautions  Aggression Color: Green    Plan: Patient rested well overnight. VSS on RA. Patient denies chest pain but is ZULETA. Heparin gtt running at 1550 unit(s)/hr; next HepXa is at 1130 AM. Plan for EP consult today and cardiac MRI.    Continue to monitor.      Breann Ortega RN

## 2022-10-29 NOTE — PROGRESS NOTES
10/29/22 0933   Appointment Info   Signing Clinician's Name / Credentials (PT) Tenisha Zhang, PT, DPT   Living Environment   People in Home significant other   Current Living Arrangements apartment   Home Accessibility no concerns   Transportation Anticipated car, drives self;family or friend will provide   Living Environment Comments Pt denies any concerns about mobility at home   Self-Care   Usual Activity Tolerance good   Current Activity Tolerance moderate   Regular Exercise No   Equipment Currently Used at Home none   Fall history within last six months no   Activity/Exercise/Self-Care Comment Pt is independent at baseline, 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   General Information   Onset of Illness/Injury or Date of Surgery 10/27/22   Referring Physician Colleen Cueva PA-C   Patient/Family Therapy Goals Statement (PT) To go home   Pertinent History of Current Problem (include personal factors and/or comorbidities that impact the POC) Pt is 47 year old male adm on 10/27/22 as a transfer from [facility]. Pt presented to [facility] on 10/21/22 with SOB and chest pain, had recently tested positive for COVID and now with worsening symptoms. Plan was for pt to transfer to [facility] but no beds available so transferred to [facility] instead for cardiac MRI. Pt was found to have severe biventricular nonischemic cardiomyopathy and biventricular mural thrombus. Coronary arteries were clear on angiogram. Cardiac MRI revealed LV severely dilated, LVEF of 16% with severe global hypokinesis, no left ventricular noncompaction, normal right ventricular size with severely reduced RVEF of 23%, severe biatrial enlargement, mild to moderate mitral valve regurgitation, no evidence of infiltrative cardiomyopathy, possible small prior embolic infarcts in the inferolateral and apical septal walls. Current plan is for medical management.   Existing Precautions/Restrictions cardiac   Cognition   Affect/Mental  Status (Cognition) WFL   Orientation Status (Cognition) oriented x 4   Follows Commands (Cognition) WFL   Pain Assessment   Patient Currently in Pain No   Integumentary/Edema   Integumentary/Edema no deficits were identifed   Posture    Posture Not impaired   Range of Motion (ROM)   ROM Comment B LE ROM WFL   Strength (Manual Muscle Testing)   Strength Comments B LE strength WFL   Bed Mobility   Bed Mobility no deficits identified   Transfers   Transfers no deficits identified   Gait/Stairs (Locomotion)   [place] Level (Gait) independent   Balance   Balance Comments No LOB with ambulation or functional reach tasks   Clinical Impression   Criteria for Skilled Therapeutic Intervention Yes, treatment indicated   PT Diagnosis (PT) Impaired mobility   Influenced by the following impairments Decreased activity tolerance   Functional limitations due to impairments Decreased ability to participate in daily tasks   Clinical Presentation (PT Evaluation Complexity) Evolving/Changing   Clinical Presentation Rationale Current presentation, [facility]   Clinical Decision Making (Complexity) low complexity   Planned Therapy Interventions (PT) progressive activity/exercise;risk factor education;home program guidelines   Risk & Benefits of therapy have been explained evaluation/treatment results reviewed;care plan/treatment goals reviewed;risks/benefits reviewed;current/potential barriers reviewed;participants voiced agreement with care plan;participants included;patient   PT Total Evaluation Time   PT Eval, Low Complexity Minutes (39507) 10   Physical Therapy Goals   PT Frequency Daily   PT Predicted Duration/Target Date for Goal Attainment 11/05/22   PT Goals Cardiac Phase 1   PT: Understanding of cardiac education to maximize quality of life, condition management, and health outcomes Patient;Demonstrate;Verbalize   PT: Perform aerobic activity with stable cardiovascular response continuous;15 minutes;ambulation;treadmill   PT:  Functional/aerobic ambulation tolerance with stable cardiovascular response in order to return to home and community environment Independent;Greater than 300 feet   PT: Navigation of stairs simulating home set up with stable cardiovascular response in order to return to home and community environment Independent;10 stairs   Interventions   Interventions Quick Adds Therapeutic Activity;Therapeutic Procedure;Cardiac Rehab   Therapeutic Procedure/Exercise   Ther. Procedure: strength, endurance, ROM, flexibillity Minutes (37955) 10   Symptoms Noted During/After Treatment none   Treatment Detail/Skilled Intervention Education provided on signs/symptoms of intolerance   Treatment Time Includes (CR Only) See specific exercise details intervention group(s);Monitoring of vital signs (see vital signs flowsheet for details)   Therapeutic Activity   Therapeutic Activities: dynamic activities to improve functional performance Minutes (71976) 15   Symptoms Noted During/After Treatment None   Treatment Detail/Skilled Intervention CHF education provided, see below.   Ambulation   Workload In room ambulation   Duration (minutes) 9 minutes   Effort Scale 1   Symptoms Denies symptoms   Cardiovascular Response Normal   Exercise Details Ambulates with steady gait, no LOB, stayed in room due to COVID precautions   Vital Signs Details 120/91 after ambulation   Cardiac Education   Education Provided Daily weights;Diet;Energy conservation;Home exercise program;OMNI Scale;Precautions;Resuming home activities;Signs and symptoms   Education Packet Given to Patient No   PT Discharge Planning   PT Plan Provide education packet, progress activity as garrison   PT Discharge Recommendation (DC Rec) home   PT Rationale for DC Rec Pt is able to mobilize independently, tolerates activity. Outpatient cardiac rehab at discretion of cardiology team.   PT Brief overview of current status Independent in room   Total Session Time   Timed Code Treatment Minutes  25   Total Session Time (sum of timed and untimed services) 35

## 2022-10-29 NOTE — PROGRESS NOTES
[facility]    Hospitalist Progress Note      Assessment & Plan   Tom Rosario is a 47 year old generally healthy male who was admitted to [facility] 10/21/22-10/27/22 for SOB and chest heaviness, workup revealed NSTEMI, +COVID-19 infection, and severe cardiomyopathy and LV thrombus or tumor. Transferred to [facility] on 10/27/2022 for cardiac MRI for further workup.    New severe biventricular dysfunction - unknown etiology   Severe cardiomyopathy HFrEF - EF 15-20%  Apical LV thrombus   Found to have severe biventricular dysfunction and seen by cardiology. Weight reportedly down 7# at [facility] from admission, did not receive diuretics. Started on beta blocker, ARB. Taken to cath lab on 10/27 at [facility] and found to have clean coronaries with 0% disease throughout, nonischemic dilated cardiomyopathy with EVEF 20%, normal RCA dominant coronary arteries. Cardiology recommended txr to [facility] for cardiac MRI. Appears euvolemic on admission to [facility], no hypoxia, well appearing without chest pain, SOB, palpitations, N/V, lightheadedness, or dizziness. Reports -180#, last weight at [facility] reportedly 171#.  - Telemetry  - Cardiology consult: Eliquis 10 mg BID (x1 week), then reduce to 5 mg BID. Stop heparin drip one hour after first dose of Eliquis. Stop Losartan, start Entresto 24/26 mg BID. Provide $10 copay card on discharge.Continue Toprol XL 25 mg BID. Start jardiance 10 mg daily. Plan to add MDR in the outpatient setting.  LV function and ICD candidacy in 3 months. Follow-up in core clinic. Outpatient genetics consult. Cardiac Rebab.   - Cardiac MRI  10/28/2022: ' Severe non-ischemic cardiomyopathy with evidence of LV/RV apical thrombi; two focal areas of late gadolinium enhancement involving the basal inferolateral wall and the  apical septal wall. These may be consistent with small prior infarcts (possibly embolic).    Given the low overall scar burden (6%)  this would not be expected to account for the severity of the  patient's cardiomyopathy. '  - Intake/Output, daily standing weights        Non ST elevation myocardial infarction (NSTEMI)  Presented with chest pain and ZULETA to [facility] after recent COVID-19 infection and treated subsequently for bacterial pneumonia. EKG revealed NSR with possible LAE and TWI in lateral leads. Troponin peaked at 177 and downtrended. ProBNP in the ED 2,732.  FLP: , HDL 31 L, LDL 77, TG 89. HbA1c 5.5%.   CXR showed mild right perihilar predominant pulmonary opacities possibly representing infection vs atelectasis. Procalcitonin low risk at 0.06. No pleural effusions or pulmonary edema on CXR. Of note, recent CXR 10/11 at urgent care showed enlarged cardiac silhousette.   Symptoms improved with SL nitroglycerin. He was started on heparin gtt, ASA, and beta blockade.   * ECHO revealed EF 15-20%, grade 3 diastolic dysfunction, severely reduced LV systolic function, apical left ventricular thrombus vs tumor.   -Cor Cath/Angio cited by Cardiology:  no coronary atherosclerosis. See MR results above.      Short runs of ventricular tachycardia  Paroxysmal narrow complex tachycardia, likely atrial flutter, new finding  Prolonged QTc  EKG reviwed by Dr. Arreaga 10/28/2022  and he feels this is atrial tachycardia  -telemetry  -Continue Toprol XL 25 mg BID.  -discharge with 30d event monitor.  - PRN IV metoprolol for sustained heart rate >120  - Monitor K+/Mg++, replace PRN  -Cardiology follow-up LV function and ICD candidacy in 3 months.      Confirmed COVID-19 infection      Symptom Onset end of september   Date of 1st Positive Test +Home test end of Sept 2022   Vaccination Status Unvaccinated         Reports he initially tested positive on a home test end of September.  He presented to urgent care in 10/11 for symptoms and was started on abx for pneumonia at that time.  He has recovered and is no current symptoms including cough, fevers, chills,  or shortness of breath.  - COVID-19 special precautions until ok to remove from infection prevention. Unfortunately no documented test until admission to [facility] on 10/21/22.     Chronic kidney disease, stage 2  Baseline Cr unclear. On admission to [facility], 1.18. Has ranged 1.2-1.3 range at [facility]. May be chronic CKD vs newer onset in the setting of above  - Avoid nephrotoxins - contrast, NSAIDs  - Renally dose medications as able/needed  - Cr 1.26     Mild normocytic anemia  Hgb ranging 12-13 range in setting of acute illness.  - Monitor             Recent Labs   Lab 10/27/22  0530 10/25/22  0626 10/24/22  0650 10/21/22  1751 10/21/22  1524   HGB 12.8* 12.7* 13.5 13.1* 13.5         Other Resolved [facility] Hospital problems  Thrombocytosis  Hypomagnesemia    DVT Prophylaxis: DUAC  Code Status: Full Code  Expected discharge,  Tomorrow with 30day event monitor; needs attentive discharge planning reviewing all medications,[ new to him] and establish PCP;  specific follow-up appts including [facility] AAP first available to arrange genetic testing and EP follow-up , attending follow-up appt; Core Clinic; Cardiac Rehab;  ie  Cardiology plan established    Larry Main MD  Text Page (7am - 6pm, M-F)    Total unit/floor time 35 minutes:  time consisted of the following, examination of patient, review of records including labs, imaging results, medications, interdisciplinary notes and completing documentation; > 50% Counseling/discussion with Patient regarding current condition including new CM, LV thrombus; home situation, +Covid status; strong encouragement for vaccination and booster given cardiac diagnosis and Coordination of Care time with Nursing  and Specialists, Cardiology regarding CM/VT/LV thrombus care plan, management and surveillance.       Interval History   Feels well, he always felt that he had a cardiac condition but unaware of diagnosis.  Wants to get better, somewhat overwhelmed regarding new  medications and follow-up.  Remains somewhat skeptical/refractory regarding COVID vaccination/booster.       -Data reviewed today: I reviewed all new labs and imaging results over the last 24 hours.     Physical Exam   Temp: 98.6  F (37  C) Temp src: Oral BP: (!) 120/91 Pulse: 76   Resp: 18 SpO2: 100 % O2 Device: None (Room air)    Vitals:    10/29/22 0509   Weight: 74.5 kg (164 lb 4.8 oz)     Vital Signs with Ranges  Temp:  [98.5  F (36.9  C)-98.7  F (37.1  C)] 98.6  F (37  C)  Pulse:  [71-78] 76  Resp:  [16-18] 18  BP: ()/(68-91) 120/91  SpO2:  [100 %] 100 %  I/O last 3 completed shifts:  In: 240 [P.O.:240]  Out: -     GENERAL:  NAD, alert, calm, cooperative     HEENT; atraumatic  PULMONARY:   Respirations nonlabored room air  CARDIOVASCULAR:   Regular, no murmur  GI:   Soft, nontender  Neuro.  Gross motor tested, nonfocal, sensory intact  Psych oriented, affect calm     Medications     - MEDICATION INSTRUCTIONS -         apixaban ANTICOAGULANT  10 mg Oral BID     empagliflozin  10 mg Oral Daily     metoprolol succinate ER  25 mg Oral BID     sacubitril-valsartan  1 tablet Oral BID     senna-docusate  1 tablet Oral BID    Or     senna-docusate  2 tablet Oral BID     sodium chloride (PF)  10 mL Intravenous Once     sodium chloride (PF)  3 mL Intracatheter Q8H       Data   Recent Labs   Lab 10/29/22  0610 10/28/22  1625 10/28/22  0442 10/27/22  0530   WBC 3.9*  --  4.2 3.8*   HGB 13.4  --  12.7* 12.8*   MCV 90  --  92 95     --  364 402     --  137 139   POTASSIUM 4.2  --  4.3 4.5   CHLORIDE 107  --  106 106   CO2 23  --  26 23   BUN 18  --  18 10.8   CR 1.26* 1.26* 1.30* 1.22*   ANIONGAP 9  --  5 10   CHARLIE 8.7  --  8.9 8.7   GLC 83  --  98 92   ALBUMIN  --   --  2.6*  --    PROTTOTAL  --   --  6.1*  --    BILITOTAL  --   --  1.0  --    ALKPHOS  --   --  88  --    ALT  --   --  66  --    AST  --   --  33  --        No results found for this or any previous visit (from the past 24 hour(s)).

## 2022-10-29 NOTE — PROGRESS NOTES
"[facility] Cardiology Progress Note  Date of Service: 10/29/2022  Primary Cardiologist: Will be Dr. Hendrickson    Tom Rosario is a 47 year old male with no prior medical history, who was diagnosed with and treated for COVID-19 pneumonia on , admitted to [facility] on  with progressive shortness of breath and chest heaviness, found to have patchy groundglass opacities on CT, and severely reduced biventricular dysfunction on echocardiogram with an LVEF of <20% and an LVIDd of 7 cm.  Additionally, there was an LV apical thrombus and some trabeculation possibly consistent with noncompaction.  He had a very minimal troponin rise, not consistent with acute myocarditis.  Per report, he has had short runs of nonsustained VT and an atrial arrhythmia on 10/24 which the [facility] cardiology team felt was atrial flutter; however, I reviewed his EKG with Dr. Arreaga today and he feels this is atrial tachycardia.  Cath showed no coronary atherosclerosis. He was started on GDMT with BB and ARB but has not been felt to require diuretic therapy.  He was on the wait list for the [facility] however due to lack of beds, he was transferred to [facility] on 10/27/2022 for a cardiac MRI, completed yesterday.    Subjective: Prior dyspnea, orthopnea, and chest tightness have resolved. Intermittent palpitations \"for my whole life.\" No unprovoked history of syncope/near syncope.     Assessment:  1. Severe dilated, nonischemic CMP, with an LVEF of <20% and an LVIDd of 7 cm, likely chronic.    -- Acute decompensation in the setting of recent COVID-19 PNA. Now improved.   -- Initially EKG's demonstrated a prolonged QTc; this has resolved.    -- Family history of a cousin (father's side) with \"enlarged heart\" who  as a teenager. He is not able to get more details on this.    -- No significant ETOH or drug use history.   2. Large LV and RV thrombi. Off heparin, now on " Eliquis.  3. Paroxysmal atrial tachycardia. No further NSVT seen.   4. Normal coronary arteries.  5. Mild-mod functional MR.  6. Hypoalbuminemia. Albumin 2.6.   7. Recently treated COVID-19 PNA (10/11).   8. Mild CKD, creat ~1.3.  9. Social - Intermittent tobacco use on/off x years. Has 6 adult children who live out of state, and one 9 yr old daughter who lives with him and his fiancee in [place] (daughter, aged 9). 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 Looking into short-term disability/FMLA.     Plan:   1. Continue Eliquis, likely life-long unless LV function normalizes. Please provide Eliquis copay card.  2. Continue Entresto 24/26 mg BID. Please provide $10 copay card on discharge.  3. Continue Toprol XL 25 mg BID.  4. Continue Jardiance 10 mg daily, (copay card?).   5. Plan to add isaiah in the outpatient setting.    6. 30d event monitor at discharge.  7. CORE clinic consultation. Has a scale, will weigh daily.   8. Ultimate referral to the AHF team at the [facility] for discharge home from a cardiology standpoint    Georgi Jones MD  ____________________________________________  Physical Exam   Temp: 98.6  F (37  C) Temp src: Oral BP: (!) 120/91 Pulse: 76   Resp: 18 SpO2: 100 % O2 Device: None (Room air)    Vitals:    10/29/22 0509   Weight: 74.5 kg (164 lb 4.8 oz)       GENERAL:  The patient is in no apparent distress.   NECK: CVP appears normal, no masses or thyromegaly.  PULMONARY:  There is a normal respiratory effort. Clear lungs to auscultation bilaterally.   CARDIOVASCULAR:  RRR, normal S1 S2, no m/r/g.  GI:  Non tender abdomen with normoactive bowel sounds and no hepatosplenomegaly. There are no masses palpable.   EXTREMITIES:  No clubbing, cyanosis or edema.  VASCULAR: 2+ Pulses bilaterally in upper and lower extremities.    Medications     - MEDICATION INSTRUCTIONS -         apixaban ANTICOAGULANT  10 mg Oral BID     empagliflozin  10 mg Oral Daily     metoprolol succinate ER  25 mg Oral BID      sacubitril-valsartan  1 tablet Oral BID     senna-docusate  1 tablet Oral BID    Or     senna-docusate  2 tablet Oral BID     sodium chloride (PF)  10 mL Intravenous Once     sodium chloride (PF)  3 mL Intracatheter Q8H       Data   Most Recent 3 CBC's:  Recent Labs   Lab Test 10/29/22  0610 10/28/22  0442 10/27/22  0530   WBC 3.9* 4.2 3.8*   HGB 13.4 12.7* 12.8*   MCV 90 92 95    364 402     Most Recent 3 BMP's:  Recent Labs   Lab Test 10/29/22  0610 10/28/22  1625 10/28/22  0442 10/27/22  0530     --  137 139   POTASSIUM 4.2  --  4.3 4.5   CHLORIDE 107  --  106 106   CO2 23  --  26 23   BUN 18  --  18 10.8   CR 1.26* 1.26* 1.30* 1.22*   ANIONGAP 9  --  5 10   CHARLIE 8.7  --  8.9 8.7   GLC 83  --  98 92     Most Recent 2 LFT's:  Recent Labs   Lab Test 10/28/22  0442   AST 33   ALT 66   ALKPHOS 88   BILITOTAL 1.0     Most Recent 3 BNP's:  Recent Labs   Lab Test 10/21/22  1524   NTBNPI 2,732*     Most Recent TSH and T4:No lab results found.  Most Recent ESR & CRP:No lab results found.

## 2022-10-29 NOTE — PLAN OF CARE
Goal Outcome Evaluation:  A&O x4, VSS on RA. Tele SR. Up to bathroom independently. Old Right radial puncture site CDI. No hematoma. +pulses. CMS intact. Denies SOB or any pain. Plans for possible discharge today.

## 2022-10-30 NOTE — DISCHARGE SUMMARY
[facility]    Discharge Summary  Hospitalist    Date of Admission:  10/27/2022  Date of Discharge:  10/30/2022  Discharging Provider: Larry Main MD  Date of Service (when I saw the patient): 10/30/22        Assessment & Plan   Tom Rosario is a 47 year old generally healthy male who was admitted to [facility] 10/21/22-10/27/22 for SOB and chest heaviness, workup revealed NSTEMI, +COVID-19 infection, and severe cardiomyopathy and LV thrombus or tumor. Transferred to [facility] on 10/27/2022 for cardiac MRI for further workup.    New severe biventricular dysfunction - unknown etiology   Acute systolic CHF  Severe cardiomyopathy HFrEF - EF 15-20%  Apical LV thrombus   Found to have severe biventricular dysfunction and seen by cardiology. Weight reportedly down 7# at [facility] from admission, did not receive diuretics. Started on beta blocker, ARB. Taken to cath lab on 10/27 at [facility] and found to have clean coronaries with 0% disease throughout, nonischemic dilated cardiomyopathy with EVEF 20%, normal RCA dominant coronary arteries. Cardiology recommended txr to [facility] for cardiac MRI. Appears euvolemic on admission to [facility], no hypoxia, well appearing without chest pain, SOB, palpitations, N/V, lightheadedness, or dizziness. Reports -180#, last weight at [facility] reportedly 171#.  - Cardiology consult: Eliquis 10 mg BID (x1 week), then reduce to 5 mg BID. Stop Losartan, start Entresto 24/26 mg BID. Provide $10 copay card on discharge. Continue Toprol XL 25 mg BID. Start jardiance 10 mg daily.  Co-pay card on discharge if available,  Plan to add MDR in the outpatient setting.  LV function and ICD candidacy in 3 months. Follow-up in core clinic. Outpatient genetics consult. Cardiac Rebab.   - Cardiac MRI  10/28/2022: ' Severe non-ischemic cardiomyopathy with evidence of LV/RV apical thrombi; two focal areas of late gadolinium enhancement involving the  basal inferolateral wall and the  apical septal wall. These may be consistent with small prior infarcts (possibly embolic).    Given the low overall scar burden (6%) this would not be expected to account for the severity of the  patient's cardiomyopathy. '  Patient with COVID diagnosis, no significant symptomatology, unclear if CM related.        Non ST elevation myocardial infarction (NSTEMI)  Presented with chest pain and ZULETA to [facility] after recent COVID-19 infection and treated subsequently for bacterial pneumonia. EKG revealed NSR with possible LAE and TWI in lateral leads. Troponin peaked at 177 and downtrended. ProBNP in the ED 2,732.  FLP: , HDL 31 L, LDL 77, TG 89. HbA1c 5.5%.   CXR showed mild right perihilar predominant pulmonary opacities possibly representing infection vs atelectasis. Procalcitonin low risk at 0.06. No pleural effusions or pulmonary edema on CXR. Of note, recent CXR 10/11 at urgent care showed enlarged cardiac silhousette.   Symptoms improved with SL nitroglycerin. He was started on heparin gtt, ASA, and beta blockade.   * ECHO revealed EF 15-20%, grade 3 diastolic dysfunction, severely reduced LV systolic function, apical left ventricular thrombus vs tumor.   -Cor Cath/Angio cited by Cardiology:  no coronary atherosclerosis. See MR results above.      Short runs of ventricular tachycardia  Paroxysmal narrow complex tachycardia, likely atrial flutter, new finding  Prolonged QTc  EKG reviwed by Dr. Arreaga 10/28/2022  and he feels this is atrial tachycardia  -Continue Toprol XL 25 mg BID.  -discharge with 30d event monitor.  -Cardiology follow-up LV function and ICD candidacy in 3 months.      Confirmed COVID-19 infection      Symptom Onset end of september   Date of 1st Positive Test +Home test end of Sept 2022   Vaccination Status Unvaccinated         Reports he initially tested positive on a home test end of September.  He presented to urgent care in 10/11 for symptoms and was  started on abx for pneumonia at that time.  He has recovered and is no current symptoms including cough, fevers, chills, or shortness of breath.  - COVID-19 special precautions until ok to remove from infection prevention. Unfortunately no documented test until admission to [facility] on 10/21/22.  -Strongly encouraged vaccination and boosting given new cardiac diagnoses.     Chronic kidney disease, stage 2  Baseline Cr unclear. On admission to [facility], 1.18. Has ranged 1.2-1.3 range at [facility]. May be chronic CKD vs newer onset in the setting of above  - Avoid nephrotoxins - contrast, NSAIDs  - Renally dose medications as able/needed  - Cr 1.26; BMP follow-up with PCP on hospital discharge      Mild normocytic anemia  Hgb ranging 12-13 range in setting of acute illness.  -CBC with PCP on hospital discharge     Code Status   Full Code       Primary Care Physician   Physician No Ref-Primary to be established at [facility]      Physical Exam   Temp: 98.5  F (36.9  C) Temp src: Oral BP: 104/88 Pulse: 84   Resp: 16 SpO2: 99 % O2 Device: None (Room air)    Vitals:    10/29/22 0509 10/30/22 0650   Weight: 74.5 kg (164 lb 4.8 oz) 72.4 kg (159 lb 11.2 oz)     GENERAL:  NAD, alert, calm, cooperative     HEENT; atraumatic  PULMONARY:   Respirations nonlabored room air  CARDIOVASCULAR:   Regular, no murmur  GI:   Soft, nontender  Neuro.  Gross motor tested, nonfocal, sensory intact  Psych oriented, affect calm     Discharge Disposition   Discharged to home  Condition at discharge: Fair    Consultations This Hospital Stay   PHARMACY IP CONSULT  PHARMACY IP CONSULT  PHARMACY IP CONSULT  PHARMACY IP CONSULT  CARDIOLOGY IP CONSULT  PHARMACY LIAISON FOR MEDICATION COVERAGE CONSULT  PHARMACY LIAISON FOR MEDICATION COVERAGE CONSULT  PHARMACY LIAISON FOR MEDICATION COVERAGE CONSULT  CORE CLINIC EVALUATION IP CONSULT  CARDIAC REHAB IP CONSULT  SOCIAL WORK IP CONSULT    Time Spent on this Encounter   I, Larry Main MD, personally  saw the patient today and spent greater than 30 minutes discharging this patient discussing discharge plans with nursing, needing to coordinate discharge follow-ups including need to establish with PCP within 1 week for hospital discharge with BMP and CBC, patient prefers [facility]; and Cardiology including Cardiology JOE, Cardiology Attending, core clinic, Cardiac rehab, Cardiology EP with follow-up 30-day event monitor, and Cardiology genetic consult, unfortunately discharge occurred over the weekend and firm appointments could not be made, please contact patient regarding multiple specific PCP and Cardiology appointments as outlined above.    Discharge Orders      Follow-Up with Cardiology JOE      Follow-Up with Cardiology      Cardiac Rehab Referral      Reason for your hospital stay    Presented with new Cardiomyopathy and Left Ventricular Thrombus     Follow-up and recommended labs and tests     Follow up with primary care provider, [facility] will call you for appointment for within 7 days for hospital follow- up and to establish care.  The following labs/tests are recommended: BMP and CBC  .    Follow up with  Cardiology, multiple appointments as outlined below, they will call you for appointments, if you do not hear from their office call   -Cardiology JOE appointment   -Cardiology Attending appointment, follow-up 30-day event monitor  -Core clinic  -Cardiac rehab  -Cardiac EP appointment  -Outpatient genetic counseling     Activity    Your activity upon discharge: activity as tolerated, assistance as needed; and per Cardiac Rehabilitation     Monitor and record    Weigh yourself every morning     When to contact your care team    Contact your care team If symptoms get worse, If increased shortness of breath, If weight gain of 2 pounds a day for 2 days in a row OR 5 pounds in 1 week., Increased swelling in your ankles or legs, and Dizziness or lightheadedness     Add follow up  information to the AVS prior to printing     Cardiac Event Monitor Adult/Pediatric    Place and give instruction on hospital discharge 10/30/2022     Diet    Follow this diet upon discharge:       Low Saturated Fat Na <2400 mg     Discharge Medications   Current Discharge Medication List      START taking these medications    Details   apixaban ANTICOAGULANT (ELIQUIS) 5 MG tablet Take 2 tablets (10 mg) by mouth 2 times daily First dose this evening; then 2 [10mg] twice a day UNTIL Friday 11/4/22 start ONE [5mg] twice a day. Future refills per primary provider or cardiology  Qty: 48 tablet, Refills: 0    Associated Diagnoses: Dilated cardiomyopathy (H)      empagliflozin (JARDIANCE) 10 MG TABS tablet Take 1 tablet (10 mg) by mouth daily Future refills by Physician Provider or Cardiology.  Given co-pay card  Qty: 30 tablet, Refills: 0    Associated Diagnoses: Dilated cardiomyopathy (H)      metoprolol succinate ER (TOPROL XL) 25 MG 24 hr tablet Take 1 tablet (25 mg) by mouth 2 times daily Future refills by Primary Provider or Cardiology.  Qty: 60 tablet, Refills: 0    Associated Diagnoses: Dilated cardiomyopathy (H)      sacubitril-valsartan (ENTRESTO) 24-26 MG per tablet Take 1 tablet by mouth 2 times daily Future refills by Primary Provider or Cardiology.  Qty: 60 tablet, Refills: 0    Comments: Give copay card if available  Associated Diagnoses: Dilated cardiomyopathy (H)           Allergies   No Known Allergies  Data   Most Recent 3 CBC's:Recent Labs   Lab Test 10/29/22  0610 10/28/22  0442 10/27/22  0530   WBC 3.9* 4.2 3.8*   HGB 13.4 12.7* 12.8*   MCV 90 92 95    364 402      Most Recent 3 BMP's:  Recent Labs   Lab Test 10/29/22  0610 10/28/22  1625 10/28/22  0442 10/27/22  0530     --  137 139   POTASSIUM 4.2  --  4.3 4.5   CHLORIDE 107  --  106 106   CO2 23  --  26 23   BUN 18  --  18 10.8   CR 1.26* 1.26* 1.30* 1.22*   ANIONGAP 9  --  5 10   CHARLIE 8.7  --  8.9 8.7   GLC 83  --  98 92     Most  Recent 2 LFT's:  Recent Labs   Lab Test 10/28/22  0442   AST 33   ALT 66   ALKPHOS 88   BILITOTAL 1.0       Most Recent 3 Troponin's:No lab results found.  Most Recent Cholesterol Panel:  Recent Labs   Lab Test 10/22/22  0633   CHOL 126   LDL 77   HDL 31*   TRIG 89       Recent Labs   Lab Test 10/21/22  2342   A1C 5.5

## 2022-10-30 NOTE — DISCHARGE INSTRUCTIONS
[facility] will be contacting you to make appointment for hospital follow up visit and establishing care with a primary provider.  If you have questions, the phone number is [phone].  [facility], [facility] [address] [place], [address]

## 2022-10-30 NOTE — PLAN OF CARE
Goal Outcome Evaluation:      Plan of Care Reviewed With: patient    Overall Patient Progress: improvingOverall Patient Progress: improving       Neuro:intact, flat  CV/Rhythm:SR  Resp/02:RA  GI/Diet:LSF  :voiding per pt report  Skin/Incisions/Sites:intact  Pulses/CMS:intact  Edema:none  Activity/Falls Risk:independent in room  Lines/Drains/IVs:PIV x 2  Labs/BGM:-  Test/Procedures:none, 30 Day event monitor on discharge  VS/Pain:soft BPs, per parameters metoprolol held  DC Plan:tomorrow 10/30  Other:CORE, CR

## 2022-10-30 NOTE — PLAN OF CARE
Goal Outcome   NSG DISCHARGE NOTE    Patient discharged to home at 2:56 PM via wheel chair. Accompanied by significant other and staff. Discharge instructions reviewed with patient, opportunity offered to ask questions. Prescriptions filled and sent with patient upon discharge. All belongings sent with patient.  Appts reviewed, aware of his appts. Nov. 10th  Lab and 14th with Dinh Fried.     Arnaldo James RN

## 2022-10-30 NOTE — TELEPHONE ENCOUNTER
Reason for Call:  Appointment Request    Patient requesting this type of appt:  Hospital/ED Follow-Up     Requested provider: Ortiz mo    Reason patient unable to be scheduled: Not within requested timeframe    When does patient want to be seen/preferred time: This week or early next week    Comments: Patient seen at [facility] for MSTEMI and COVID and cardiomyopathy. Will discharge on 10/30. Next available appt is 11/30. Please call back patient and not coordinator for scheduling.     Could we send this information to you in [employer] or would you prefer to receive a phone call?:   Patient would prefer a phone call   Okay to leave a detailed message?: Yes at Cell number on file:    Telephone Information:   Mobile [phone]       Call taken on 10/30/2022 at 10:09 AM by Fiordaliza Glover

## 2022-10-30 NOTE — PLAN OF CARE
Goal Outcome Evaluation:         No changes, plan is  for discharge tomorrow, Bp soft this afternoon, SR, Afebrile, denies SOB.  Anxious to go home. Denies any pain.

## 2022-10-30 NOTE — PROGRESS NOTES
Pt without established primary provider.  Spoke with pt via telephone, due to COVID isolation.  Pt lives in [place] and preference to stay in area for primary clinic.  [facility] and [facility].  Call made to scheduling dept at [phone].  First available visit to establish care out until November 30th.   sent message to care team at clinic and they will call pt to set up visit sooner.  Information placed on AVS with clinic number and address and that they will be contacting pt to set up appointment.    Katy Cheung RN, BS  Care Coordinator  [email or web address]

## 2022-10-30 NOTE — PLAN OF CARE
Cardiac Rehab Discharge Summary    Reason for therapy discharge:    Discharged to home with outpatient therapy.    Progress towards therapy goal(s). See goals on Care Plan in Epic electronic health record for goal details.  Goals partially met.  Barriers to achieving goals:   discharge from facility.    Therapy recommendation(s):    Continued therapy is recommended.  Rationale/Recommendations:  outpatient cardiac rehab for monitored progression of aerobic activity tolerance and education to promote optimal heart health.

## 2022-10-30 NOTE — PROGRESS NOTES
A&O x4. VSS. Up independently. Tolerating regular diet. Tele SR. IV SL.  Pain denies pain. Plan for discharge today. Continue to monitor.

## 2022-10-31 NOTE — TELEPHONE ENCOUNTER
Sent [employer] message to patient to advise that we do not have any sooner openings for a new patient.  Suggested to patient to check with other clinics and/or Providers.

## 2022-10-31 NOTE — TELEPHONE ENCOUNTER
Patient was admitted to [facility] 10/21/22-10/27/22 for SOB and chest heaviness, workup revealed NSTEMI, +COVID-19 infection with bacterial PNA dx'd 10/11/22 and severe cardiomyopathy with LV thrombus or tumor. Transferred to [facility] on 10/27/2022 for cardiac MRI for further workup.    PMH: No significant prior medical hx.    Echo showed EF of 15-20%, grade 3 diastolic dysfunction, severely reduced LV systolic function, apical left ventricular thrombus vs tumor.     10/27/22: Coronary angiogram via RRA done at [facility] showed normal coronary arteries. Nonischemic dilated cardiomyopathy.    10/28/22: cMRI showed severe non-ischemic cardiomyopathy with evidence of LV/RV apical thrombi; two focal areas of late gadolinium enhancement involving the basal inferolateral wall and the apical septal wall. These may be consistent with small prior infarcts (possibly embolic). Given the low overall scar burden (6%) this would not be expected to account for the severity of the patient's cardiomyopathy.    Short runs of AT with prolonged QTc. Started on Toprol and discharged with 30 day cardiac event monitor. Cardiology follow-up LV function and ICD candidacy in 3 months.     Pt was started on Eliquis, Jardiance, Metoprolol and Entresto at time of discharge.    Called patient to discuss any post hospital d/c questions he may have, review medication changes, and confirm f/u appts, but no answer. VM left to return my phone call.     RN will confirm with patient that he has labs scheduled on 11/10/22 at 1530 in [place], and an OV scheduled on 11/14/22 at 0925 with JOE Lilly Fried at our [facility]. GUILHERME Vogel RN.

## 2022-11-01 NOTE — PROGRESS NOTES
Clinic Care Coordination Contact  [facility]/Voicemail       Clinical Data: Care Coordinator Outreach-TCM  Outreach attempted x 2.  Left message on patient's voicemail with call back information and requested return call.  Plan: Care Coordinator will make no further outreaches at this time.    GERARDO Lu   Social Work Clinic Care Coordinator   [facility]  PH: [phone]  [email or web address]

## 2022-11-14 NOTE — PROGRESS NOTES
HPI:   Mr. Rosario is a 47 year old male with a past medical history including COVID-19 Pneumonia. During hospitalization at [facility] 10/27-10/30/22 she was treated for Pneumonia and found to have atrial tachycardia, reduced EF of < 20%, LVIDD at that time noted to be 7 cm, LV and RV thrombi, and mild functional MR. Coronary angiogram was negative. GDMT was initiated with BB and ARB, she did not require diuretics at that time. Cardiac MRI 10/27/22 was consistent with NICM with two areas of late gadolinium enhancement consistent with probable embolic infarcts, large left apical thrombus, right apical thrombus, LV EF 16%, and RV EF 23%. He presents to [facility] for initial enrollment.     He is able to ambulate about 2-3 blocks prior to needing rest, which he he notes is unchanged from 6 months ago. His appetite remains unchanged. He has 7 children, most of which are grown aside from his 9 year old daughter. He lives with his daughter and Significant other of many years in [place]. He denies fever, chills, lightheadedness, dizziness, chest pain, palpitations, SOB, ZULETA, PND, orthopnea, nausea, vomiting, diarrhea, hematochezia, melena, or LE edema. His weight remains stable at 178 lbs, which he notes is his baseline. He notes his weight at the time of hospital discharge was 159-160 lbs, which he attributes to decreased intake with his hospitalization. He continues to follow a low sodium diet. He notes CAD in his grandmother, but denies any family history of heart disease. He denies street drug use, ETOH use, or risk for HIV.     PAST MEDICAL HISTORY:  Past Medical History:   Diagnosis Date     Atrial tachycardia (H)      Chronic HFrEF (heart failure with reduced ejection fraction) (H)      Left ventricular apical thrombus      Mitral regurgitation      Pneumonia due to 2019 novel coronavirus      Right ventricular apical thrombus        FAMILY HISTORY:  Family History   Problem Relation Age of Onset     Coronary Artery  Disease Maternal Grandmother        SOCIAL HISTORY:  Social History     Socioeconomic History     Marital status: Single   Tobacco Use     Smoking status: Former     Packs/day: 0.50     Years: 10.00     Pack years: 5.00     Types: Cigarettes     Smokeless tobacco: Never   Substance and Sexual Activity     Alcohol use: Not Currently     Drug use: Not Currently       CURRENT MEDICATIONS:  Outpatient Medications Prior to Visit   Medication Sig Dispense Refill     apixaban ANTICOAGULANT (ELIQUIS) 5 MG tablet Take 2 tablets (10 mg) by mouth 2 times daily First dose this evening; then 2 [10mg] twice a day UNTIL Friday 11/4/22 start ONE [5mg] twice a day. Future refills per primary provider or cardiology 48 tablet 0     empagliflozin (JARDIANCE) 10 MG TABS tablet Take 1 tablet (10 mg) by mouth daily Future refills by Physician Provider or Cardiology.  Given co-pay card 30 tablet 0     metoprolol succinate ER (TOPROL XL) 25 MG 24 hr tablet Take 1 tablet (25 mg) by mouth 2 times daily Future refills by Primary Provider or Cardiology. 60 tablet 0     sacubitril-valsartan (ENTRESTO) 24-26 MG per tablet Take 1 tablet by mouth 2 times daily Future refills by Primary Provider or Cardiology. 60 tablet 0     No facility-administered medications prior to visit.       ROS:   CONSTITUTIONAL: Denies fever, chills, fatigue, or weight fluctuations.   HEENT: Denies headache, vision changes, and changes in speech.   CV: Refer to HPI.   PULMONARY:Denies shortness of breath, cough, or previous TB exposure.   GI:Denies nausea, vomiting, diarrhea, and abdominal pain. Bowel movements are regular.   :Denies urinary alterations, dysuria, urinary frequency, hematuria, and abnormal drainage.   EXT:Denies lower extremity edema.   SKIN:Denies abnormal rashes or lesions.   MUSCULOSKELETAL:Denies upper or lower extremity weakness and pain.   NEUROLOGIC:Denies lightheadedness, dizziness, seizures, or upper or lower extremity paresthesia.     EXAM:  BP  "106/68   Pulse 74   Ht 1.905 m (6' 3\")   Wt 81.1 kg (178 lb 12.8 oz)   SpO2 99%   BMI 22.35 kg/m    GENERAL: Appears alert and oriented times three.   HEENT: Eye symmetrical and free of discharge bilaterally. Mucous membranes moist and without lesions.  NECK: Supple and without lymphadenopathy. JVD at clavicular line.   CV: RRR, S1S2 present without murmur, rub, or gallop.   RESPIRATORY: Respirations regular, even, and unlabored. Lungs CTA throughout.   GI: Soft and non distended with normoactive bowel sounds present in all quadrants. No tenderness, rebound, guarding. No organomegaly.   EXTREMITIES: No peripheral edema. 2+ bilateral pedal pulses.   NEUROLOGIC: Alert and orientated x 3. CN II-XII grossly intact. No focal deficits.   MUSCULOSKELETAL: No joint swelling or tenderness.   SKIN: No jaundice. No rashes or lesions.     The following Labs were reviewed today:  CBC RESULTS:  Lab Results   Component Value Date    WBC 3.9 (L) 10/29/2022    RBC 4.48 10/29/2022    HGB 13.4 10/29/2022    HCT 40.4 10/29/2022    MCV 90 10/29/2022    MCH 29.9 10/29/2022    MCHC 33.2 10/29/2022    RDW 13.7 10/29/2022     10/29/2022       CMP RESULTS:  Lab Results   Component Value Date     11/10/2022    POTASSIUM 3.9 11/10/2022    POTASSIUM 4.2 10/29/2022    CHLORIDE 103 11/10/2022    CHLORIDE 107 10/29/2022    CO2 24 11/10/2022    CO2 23 10/29/2022    ANIONGAP 13 11/10/2022    ANIONGAP 9 10/29/2022     (H) 11/10/2022    GLC 83 10/29/2022    BUN 14.3 11/10/2022    BUN 18 10/29/2022    CR 1.36 (H) 11/10/2022    GFRESTIMATED 64 11/10/2022    CHARLIE 9.2 11/10/2022    BILITOTAL 1.0 10/28/2022    ALBUMIN 2.6 (L) 10/28/2022    ALKPHOS 88 10/28/2022    ALT 66 10/28/2022    AST 33 10/28/2022        INR RESULTS:  Lab Results   Component Value Date    INR 1.16 (H) 10/21/2022       Lab Results   Component Value Date    MAG 2.1 10/28/2022     Lab Results   Component Value Date    NTBNPI 2,732 (H) 10/21/2022     Lab Results "   Component Value Date    NTBNP 2,258 (H) 11/10/2022       The following diagnostics were reviewed today:   Echo 10/22/22:   Interpretation Summary     Dilated biventricular cardiomyoppathy with severe LV and RV systolic function  EF 15-20%  Large very well organized mass in apex 2.5cm by 3.0cm, possible thrombus vs  tumor  Results communicated to ordering physician. There is no comparison study  Available.    Coronary Angiogram 10/21/22: NCA with RCA dominant.     Cardiac MRI 10/27/22:   1. The LV is severely dilated with normal wall thickness. The global systolic function is severely reduced.  The LVEF is 16%. There is severe global hypokinesis of the left ventricle.      Prominent trabeculations are noted involving the left ventricular apex and mid to basal anterolateral wall.  These do not meet criteria for left ventricular non-compaction.      2. The RV is normal in cavity size. The global systolic function is severely reduced. The RVEF is 23%.      3. There is severe bi-atrial enlargement.      4. There is mild to moderate mitral regurgitation.      5. There is no evidence of myocardial edema on T2 weighted imaging. There is no evidence of myocardial iron  overload.      6.  There are two focal areas of late gadolinium enhancement involving the basal inferolateral wall and the  apical septal wall. These may be consistent with small prior infarcts (possibly embolic).    Given the low overall scar burden (6%) this would not be expected to account for the severity of the  patient's cardiomyopathy.      7.  Small bilateral pleural effusions are noted.      8. A large (22 mm x 13 mm) apical left ventricular thrombus is noted.  A right ventricular apical thrombus  measuring 10 mm x 7 mm is also noted.      CONCLUSIONS:      1) Severe non-ischemic cardiomyopathy with evidence of LV/RV apical thrombi as described above.     2) There are two focal areas of late gadolinium enhancement involving the basal inferolateral  wall and the  apical septal wall. These may be consistent with small prior infarcts (possibly embolic).    Given the low overall scar burden (6%) this would not be expected to account for the severity of the  patient's cardiomyopathy.     Assessment and Plan:   Mr. Rosario is a pleasant 47 year old male with a past medical history including COVID-19 PNA, Atach, chronic BV SCHF, large left apical thrombi, and right apical thrombi. He presents for CORE enrollment.     Chronic BV systolic heart failure secondary to NICM. Identified in setting of COVID-19 PNA. However, in setting of LVIDD of 7 cm acute finding unlikely, likely acute flare of chronic disease. Cardiac MRI negative for fibrosis. Angio consistent with NCA. TSH 5.75 with Free T4 0.95. Add on Ferritin, but note no suggestion of iron overload on MRI. Denies drug use or strong family history. Low risk for HIV.   Stage C, NYHA Class II  ACEi/ARB/ARNI Entresto increased to 49/51 mg po BID   BB Toprol XL 25 mg po BID   Aldosterone antagonist deferred while other medical therapy is prioritized, consider at next visit   SGLT2I: Jardiance 10 mg po daily   SCD prophylaxis GDMT. Currently wearing Zio around day 11 of 30.   Fluid status euvolemic  - Mildly elevated TSH with normal Free T4, repeat in 4 weeks.     Atach. Currently wearing Zio.   - Monitor at this time.     Large Apical LV thrombus. Right Apical RV thrombus.   - Continue Eliquis.     Mild MR, functional.     Follow up CORE and BMP in 2 weeks. He will likely transition to [place] when there is availability.        Lilly Fried, MIRIAM CNP  11/14/2022          CC  KRISTIN MORA

## 2022-11-14 NOTE — LETTER
11/14/2022    Physician No Ref-Primary  No address on file    RE: Tom Rosario       Dear Colleague,     I had the pleasure of seeing Tom Rosario in the [facility].  HPI:   Mr. Rosario is a 47 year old male with a past medical history including COVID-19 Pneumonia. During hospitalization at [facility] 10/27-10/30/22 she was treated for Pneumonia and found to have atrial tachycardia, reduced EF of < 20%, LVIDD at that time noted to be 7 cm, LV and RV thrombi, and mild functional MR. Coronary angiogram was negative. GDMT was initiated with BB and ARB, she did not require diuretics at that time. Cardiac MRI 10/27/22 was consistent with NICM with two areas of late gadolinium enhancement consistent with probable embolic infarcts, large left apical thrombus, right apical thrombus, LV EF 16%, and RV EF 23%. He presents to [facility] for initial enrollment.     He is able to ambulate about 2-3 blocks prior to needing rest, which he he notes is unchanged from 6 months ago. His appetite remains unchanged. He has 7 children, most of which are grown aside from his 9 year old daughter. He lives with his daughter and Significant other of many years in [place]. He denies fever, chills, lightheadedness, dizziness, chest pain, palpitations, SOB, ZULETA, PND, orthopnea, nausea, vomiting, diarrhea, hematochezia, melena, or LE edema. His weight remains stable at 178 lbs, which he notes is his baseline. He notes his weight at the time of hospital discharge was 159-160 lbs, which he attributes to decreased intake with his hospitalization. He continues to follow a low sodium diet. He notes CAD in his grandmother, but denies any family history of heart disease. He denies street drug use, ETOH use, or risk for HIV.     PAST MEDICAL HISTORY:  Past Medical History:   Diagnosis Date     Atrial tachycardia (H)      Chronic HFrEF (heart failure with reduced ejection fraction) (H)      Left ventricular apical thrombus      Mitral  regurgitation      Pneumonia due to 2019 novel coronavirus      Right ventricular apical thrombus        FAMILY HISTORY:  Family History   Problem Relation Age of Onset     Coronary Artery Disease Maternal Grandmother        SOCIAL HISTORY:  Social History     Socioeconomic History     Marital status: Single   Tobacco Use     Smoking status: Former     Packs/day: 0.50     Years: 10.00     Pack years: 5.00     Types: Cigarettes     Smokeless tobacco: Never   Substance and Sexual Activity     Alcohol use: Not Currently     Drug use: Not Currently       CURRENT MEDICATIONS:  Outpatient Medications Prior to Visit   Medication Sig Dispense Refill     apixaban ANTICOAGULANT (ELIQUIS) 5 MG tablet Take 2 tablets (10 mg) by mouth 2 times daily First dose this evening; then 2 [10mg] twice a day UNTIL Friday 11/4/22 start ONE [5mg] twice a day. Future refills per primary provider or cardiology 48 tablet 0     empagliflozin (JARDIANCE) 10 MG TABS tablet Take 1 tablet (10 mg) by mouth daily Future refills by Physician Provider or Cardiology.  Given co-pay card 30 tablet 0     metoprolol succinate ER (TOPROL XL) 25 MG 24 hr tablet Take 1 tablet (25 mg) by mouth 2 times daily Future refills by Primary Provider or Cardiology. 60 tablet 0     sacubitril-valsartan (ENTRESTO) 24-26 MG per tablet Take 1 tablet by mouth 2 times daily Future refills by Primary Provider or Cardiology. 60 tablet 0     No facility-administered medications prior to visit.       ROS:   CONSTITUTIONAL: Denies fever, chills, fatigue, or weight fluctuations.   HEENT: Denies headache, vision changes, and changes in speech.   CV: Refer to HPI.   PULMONARY:Denies shortness of breath, cough, or previous TB exposure.   GI:Denies nausea, vomiting, diarrhea, and abdominal pain. Bowel movements are regular.   :Denies urinary alterations, dysuria, urinary frequency, hematuria, and abnormal drainage.   EXT:Denies lower extremity edema.   SKIN:Denies abnormal rashes or  "lesions.   MUSCULOSKELETAL:Denies upper or lower extremity weakness and pain.   NEUROLOGIC:Denies lightheadedness, dizziness, seizures, or upper or lower extremity paresthesia.     EXAM:  /68   Pulse 74   Ht 1.905 m (6' 3\")   Wt 81.1 kg (178 lb 12.8 oz)   SpO2 99%   BMI 22.35 kg/m    GENERAL: Appears alert and oriented times three.   HEENT: Eye symmetrical and free of discharge bilaterally. Mucous membranes moist and without lesions.  NECK: Supple and without lymphadenopathy. JVD at clavicular line.   CV: RRR, S1S2 present without murmur, rub, or gallop.   RESPIRATORY: Respirations regular, even, and unlabored. Lungs CTA throughout.   GI: Soft and non distended with normoactive bowel sounds present in all quadrants. No tenderness, rebound, guarding. No organomegaly.   EXTREMITIES: No peripheral edema. 2+ bilateral pedal pulses.   NEUROLOGIC: Alert and orientated x 3. CN II-XII grossly intact. No focal deficits.   MUSCULOSKELETAL: No joint swelling or tenderness.   SKIN: No jaundice. No rashes or lesions.     The following Labs were reviewed today:  CBC RESULTS:  Lab Results   Component Value Date    WBC 3.9 (L) 10/29/2022    RBC 4.48 10/29/2022    HGB 13.4 10/29/2022    HCT 40.4 10/29/2022    MCV 90 10/29/2022    MCH 29.9 10/29/2022    MCHC 33.2 10/29/2022    RDW 13.7 10/29/2022     10/29/2022       CMP RESULTS:  Lab Results   Component Value Date     11/10/2022    POTASSIUM 3.9 11/10/2022    POTASSIUM 4.2 10/29/2022    CHLORIDE 103 11/10/2022    CHLORIDE 107 10/29/2022    CO2 24 11/10/2022    CO2 23 10/29/2022    ANIONGAP 13 11/10/2022    ANIONGAP 9 10/29/2022     (H) 11/10/2022    GLC 83 10/29/2022    BUN 14.3 11/10/2022    BUN 18 10/29/2022    CR 1.36 (H) 11/10/2022    GFRESTIMATED 64 11/10/2022    CHARLIE 9.2 11/10/2022    BILITOTAL 1.0 10/28/2022    ALBUMIN 2.6 (L) 10/28/2022    ALKPHOS 88 10/28/2022    ALT 66 10/28/2022    AST 33 10/28/2022        INR RESULTS:  Lab Results "   Component Value Date    INR 1.16 (H) 10/21/2022       Lab Results   Component Value Date    MAG 2.1 10/28/2022     Lab Results   Component Value Date    NTBNPI 2,732 (H) 10/21/2022     Lab Results   Component Value Date    NTBNP 2,258 (H) 11/10/2022       The following diagnostics were reviewed today:   Echo 10/22/22:   Interpretation Summary     Dilated biventricular cardiomyoppathy with severe LV and RV systolic function  EF 15-20%  Large very well organized mass in apex 2.5cm by 3.0cm, possible thrombus vs  tumor  Results communicated to ordering physician. There is no comparison study  Available.    Coronary Angiogram 10/21/22: NCA with RCA dominant.     Cardiac MRI 10/27/22:   1. The LV is severely dilated with normal wall thickness. The global systolic function is severely reduced.  The LVEF is 16%. There is severe global hypokinesis of the left ventricle.      Prominent trabeculations are noted involving the left ventricular apex and mid to basal anterolateral wall.  These do not meet criteria for left ventricular non-compaction.      2. The RV is normal in cavity size. The global systolic function is severely reduced. The RVEF is 23%.      3. There is severe bi-atrial enlargement.      4. There is mild to moderate mitral regurgitation.      5. There is no evidence of myocardial edema on T2 weighted imaging. There is no evidence of myocardial iron  overload.      6.  There are two focal areas of late gadolinium enhancement involving the basal inferolateral wall and the  apical septal wall. These may be consistent with small prior infarcts (possibly embolic).    Given the low overall scar burden (6%) this would not be expected to account for the severity of the  patient's cardiomyopathy.      7.  Small bilateral pleural effusions are noted.      8. A large (22 mm x 13 mm) apical left ventricular thrombus is noted.  A right ventricular apical thrombus  measuring 10 mm x 7 mm is also noted.      CONCLUSIONS:       1) Severe non-ischemic cardiomyopathy with evidence of LV/RV apical thrombi as described above.     2) There are two focal areas of late gadolinium enhancement involving the basal inferolateral wall and the  apical septal wall. These may be consistent with small prior infarcts (possibly embolic).    Given the low overall scar burden (6%) this would not be expected to account for the severity of the  patient's cardiomyopathy.     Assessment and Plan:   Mr. Rosario is a pleasant 47 year old male with a past medical history including COVID-19 PNA, Atach, chronic BV SCHF, large left apical thrombi, and right apical thrombi. He presents for CORE enrollment.     Chronic BV systolic heart failure secondary to NICM. Identified in setting of COVID-19 PNA. However, in setting of LVIDD of 7 cm acute finding unlikely, likely acute flare of chronic disease. Cardiac MRI negative for fibrosis. Angio consistent with NCA. TSH 5.75 with Free T4 0.95. Add on Ferritin, but note no suggestion of iron overload on MRI. Denies drug use or strong family history. Low risk for HIV.   Stage C, NYHA Class II  ACEi/ARB/ARNI Entresto increased to 49/51 mg po BID   BB Toprol XL 25 mg po BID   Aldosterone antagonist deferred while other medical therapy is prioritized, consider at next visit   SGLT2I: Jardiance 10 mg po daily   SCD prophylaxis GDMT. Currently wearing Zio around day 11 of 30.   Fluid status euvolemic  - Mildly elevated TSH with normal Free T4, repeat in 4 weeks.     Atach. Currently wearing Zio.   - Monitor at this time.     Large Apical LV thrombus. Right Apical RV thrombus.   - Continue Eliquis.     Mild MR, functional.     Follow up CORE and BMP in 2 weeks. He will likely transition to [place] when there is availability.    MIRIAM Black CNP  11/14/2022    CC  KRISTIN MORA    Thank you for allowing me to participate in the care of your patient.      Sincerely,     MIRIAM Black CNP      Health  [facility]  cc:   Carlota Hastings MD  [address]  [place] [id number]

## 2022-11-14 NOTE — PATIENT INSTRUCTIONS
Call CORE nurse for any questions or concerns:  [phone]   *If you have concerns after hours, please call [phone], option 2 to speak with on call Cardiologist.    1. Medication changes and/or recommendations from today: Increase Entresto to 2 tablets twice a day, when you run out  the new prescription at the pharmacy.     2. Follow up plan: BMP in 2 weeks with CORE appointment in 2 weeks.      3. Weigh yourself daily and write it down.     4. Call CORE nurse if your weight is up more than 2 pounds in one day or 5 pounds in one week.     5. Call CORE nurse if you feel more short of breath, have more abdominal bloating, or leg swelling.     6. Continue low sodium diet (less than 2000 mg daily). If you eat less salt, you will retain less fluid.     7. Alcohol can weaken your heart further. You should avoid alcohol or limit its use to special times, such as a holiday or birthday.      8. Do NOT take Aleve or ibuprofen without talking to your doctor first.      9. Lab Results:   Component      Latest Ref Rng & Units 11/10/2022   Sodium      136 - 145 mmol/L 140   Potassium      3.4 - 5.3 mmol/L 3.9   Chloride      98 - 107 mmol/L 103   Carbon Dioxide (CO2)      22 - 29 mmol/L 24   Anion Gap      7 - 15 mmol/L 13   Urea Nitrogen      6.0 - 20.0 mg/dL 14.3   Creatinine      0.67 - 1.17 mg/dL 1.36 (H)   Calcium      8.6 - 10.0 mg/dL 9.2   Glucose      70 - 99 mg/dL 119 (H)   GFR Estimate      >60 mL/min/1.73m2 64   N-Terminal Pro Bnp      0 - 450 pg/mL 2,258 (H)   TSH      0.30 - 4.20 uIU/mL 5.75 (H)   T4 Free      0.90 - 1.70 ng/dL 0.95        CORE Clinic: Cardiomyopathy, Optimization, Rehabilitation, Education  The CORE Clinic is a heart failure specialty clinic within the [facility] where you will work with specialized nurse practitioners, physician assistants, doctors, and registered nurses. They are dedicated to helping patients with heart failure to carefully adjust medications, receive  education, and learn who and when to call if symptoms develop. They specialize in helping you better understand your condition, slow the progression of your disease, improve the length and quality of your life, help you detect future heart problems before they become life threatening, and avoid hospitalizations.

## 2022-11-15 NOTE — TELEPHONE ENCOUNTER
Writer notes pt did follow up on 11/14/22 as scheduled. Will close this encounter. GUILHERME Vogel RN.

## 2022-11-30 NOTE — TELEPHONE ENCOUNTER
[facility] - CORE Clinic    In response to patients ADCentricityhart message re:refills. He was seen on 11/14 by Lilly Fried. Per her clinic notes  ACEi/ARB/ARNI Entresto increased to 49/51 mg po BID   BB Toprol XL 25 mg po BID   Aldosterone antagonist deferred while other medical therapy is prioritized, consider at next visit   SGLT2I: Jardiance 10 mg po daily   Continue Eliquis. (5mg bid)    Refills sent to patients preferred pharmacy. I responded to his message w/this information.  Marcelina Nettles RN on 11/30/2022 at 10:35 AM

## 2022-12-05 NOTE — TELEPHONE ENCOUNTER
[facility] Clinic    Messaged patient with request they call to arrange follow-up.     Karley Burrell RN BSN   9:32 AM 12/05/22  CORE nurse line M-F 8a-4p: [phone]

## 2022-12-05 NOTE — TELEPHONE ENCOUNTER
[facility]    Appt scheduled. Stable labs were done 11/29/22.    Future Appointments   Date Time Provider Department Center   12/13/2022 10:50 AM Hollie Terry APRN CNP [facility] PSA CLIN     Karley Burrell RN BSN   11:20 AM 12/05/22  CORE nurse line M-F 8a-4p: [phone]

## 2022-12-13 PROBLEM — I50.22 CHRONIC HFREF (HEART FAILURE WITH REDUCED EJECTION FRACTION) (H): Status: ACTIVE | Noted: 2022-01-01

## 2022-12-13 PROBLEM — I34.0 MITRAL VALVE INSUFFICIENCY, UNSPECIFIED ETIOLOGY: Status: ACTIVE | Noted: 2022-01-01

## 2022-12-13 PROBLEM — I51.3 LV (LEFT VENTRICULAR) MURAL THROMBUS: Status: ACTIVE | Noted: 2022-01-01

## 2022-12-13 PROBLEM — I51.3 RV (RIGHT VENTRICULAR) MURAL THROMBUS: Status: ACTIVE | Noted: 2022-01-01

## 2022-12-13 PROBLEM — I47.29 NSVT (NONSUSTAINED VENTRICULAR TACHYCARDIA) (H): Status: ACTIVE | Noted: 2022-01-01

## 2022-12-13 NOTE — PROGRESS NOTES
"Cardiology Clinic Progress Note  Tom Rosario MRN# [id number]   YOB: 1975 Age: 47 year old   Primary Cardiologist: Dr. Hendrickson  Reason for visit: CORE follow up            Assessment and Plan:   Tom Rosario is a very pleasant 47 year old male here today for CORE follow up.     1.  Severe biventricular heart failure/HFrEF, nonischemic cardiomyopathy -LVEF < 20%, LVIDd 7cm, RV systolic dysfunction severely decreased    - NYHA class II, stage C   - Etiology: nonischemic, denies alcohol/drug use, no recent infection other than COVID-19 PNA 10/2022. CMRI negative for fibrosis. TSH 5.75 with Free T4 0.95.    - Fluid status : euvolemic   - Goal weight ~ 178#   - Diuretic regimen : none    - Ischemic evaluation : coronary angiogram 10/2022, normal coronaries   - Guideline directed medical therapy    - Betablocker: INCREASE metoprolol XL to 50mg BID    - ACEI/ARB/ARNI: Entresto 49/51mg BID    - Aldactone antagonist: consider at follow up appointment    - SGLT2i: Jardiance 10mg daily    - Sudden cardiac death prophylaxis : will repeat echocardiogram after optimization of medications. If LVEF remains less than or equal to 35% will refer for ICD consideration.   2. Large LV and RV thrombi - on elqiuis  3. Atrial tachycardia   4. Mild to moderate mitral regurgitation - functional  5. Nonsustained VT - noted on event monitor    I met Tom today for the first time. He is doing well from a heart failure standpoint, appears compensated and euvolemic, NYHA class II symptoms. He has noted since increase in Entresto, \"burning/mouth being sore\" after taking Entresto. Unclear etiology but seems unlikely to be related to Entresto. Advised patient to establish care with PCP to rule out other causes.  Reviewed avoiding highly acidic/irritating foods/drinks.     He has been noticing increased episodes of palpitations/\"feeling heart race\" for the past couple weeks, at time lasting up to 2 hrs, no accompanying symptoms. " "Recommended getting a 7 day zio monitor, this could also help quantify PVC burden, as frequent PVCs were noted on event monitor.     Explored further optimization of GDMT, given increased palpitations, recommended increased metoprolol XL to 50mg BID.     Referral for genetic counseling placed, he shared today that a cousin on his dads side of the family  at age 14 related to \"enlarged heart\"    Will plan to have all follow up and testing in [place] as this is where he lives.     Changes today: INCREASE metoprolol XL to 50mg BID    Follow up plan:     7 day ZIO monitor     CORE follow up with echo, labs, and 12 lead EKG prior in     Genetic consult    Needs follow up with Dr. Hendrickson     Cardiac rehab -declined    Set up appt with PCP - provided him with a number and advised to schedule follow up.         History of Presenting Illness:    Tom Rosario is a very pleasant 47 year old male with a history of chronic biventricular heart failure, nonischemic cardiomyopathy, COVID-19 PNA, atrial tachycardia, large left apical thromb and right apical thrombi and mild to moderate mitral regurgitation.     Primary cardiologist Dr. Hendrickson.     Patient was hospitalized 10/27-10/30/22 during which he was treated for pneumonia and found to have atrial tachycardia, reduced EF of < 20%, LVIDD at that time noted to be 7 cm, LV and RV thrombi, and mild functional MR. Coronary angiogram was negative. GDMT was initiated with BB and ARB, she did not require diuretics at that time. Cardiac MRI 10/27/22 was consistent with NICM with two areas of late gadolinium enhancement consistent with probable embolic infarcts, large left apical thrombus, right apical thrombus, LV EF 16%, and RV EF 23%.    Event monitor placed at time of hospital discharge showed PVCs, NSVT and possibly brief less than 10 second episodes of atrial fib/flutter.     Patient met with MIRIAM Wood, CNP for CORE enrollment in November at which time his " "Entresto was increased to 49/51mg BID.     Patient is here today for CORE follow up.     Patient reports feeling good. Biggest complaint is mouth feeling sore after taking entresto. Monitoring weights daily a home. Typically 178-179#. Denies shortness of breath at rest. Some shortness of breath with the cold. Has noted some exertional dyspnea with the stairs the past 1-2 weeks. Has needed to started using the elevator at work. Okay with 1 flight, but > 1 flight difficult. Denies orthopnea or PND.   Denies chest pain or chest tightness. Denies dizziness, lightheadedness or other presyncopal symptoms. The past 1-2 weeks has been having episodes of palpitations feeling heart racing, could last up to 2hrs, no other accompanying symptoms. Taking medications daily.     No labs today, but labs from end of November showed overall stable renal function and electrolytes. Blood pressure 107/73 and HR 86 in clinic today.    Appetite good. Eating meals at home. No set exercise routine, walking for exercise. Denies alcohol or tobacco use. Shared that his mother  of heart attack but was being treated for cancer and states cancer is why she . Had cousin his paternal side  at age 14 year from \"enlarged heart\".         Social History    Lives with significant other in [place], 7 children, 9 year gold daughter, other children grown. Works for [employer].    Social History     Socioeconomic History     Marital status: Single     Spouse name: Not on file     Number of children: Not on file     Years of education: Not on file     Highest education level: Not on file   Occupational History     Not on file   Tobacco Use     Smoking status: Former     Packs/day: 0.50     Years: 10.00     Pack years: 5.00     Types: Cigarettes     Smokeless tobacco: Never   Substance and Sexual Activity     Alcohol use: Not Currently     Drug use: Not Currently     Sexual activity: Not on file   Other Topics Concern     Not on file   Social History " Narrative     Not on file     Social Determinants of Health     Financial Resource Strain: Not on file   Food Insecurity: Not on file   Transportation Needs: Not on file   Physical Activity: Not on file   Stress: Not on file   Social Connections: Not on file   Intimate Partner Violence: Not on file   Housing Stability: Not on file            Review of Systems:   10 point ROS neg other than the symptoms noted above in the HPI.         Physical Exam:   Vitals: There were no vitals taken for this visit.   Wt Readings from Last 4 Encounters:   11/14/22 81.1 kg (178 lb 12.8 oz)   10/30/22 72.4 kg (159 lb 11.2 oz)   10/27/22 77.8 kg (171 lb 9.6 oz)     GEN: well nourished, in no acute distress.  HEENT:  Pupils equal, round. Sclerae nonicteric.   NECK: Supple, no masses appreciated. JVP appears normal.  C/V:  Regular rate and rhythm, no murmur, rub or gallop.    RESP: Respirations are unlabored. Clear to auscultation bilaterally without wheezing, rales, or rhonchi.  GI: Abdomen soft, nontender.  EXTREM: No LE edema.  NEURO: Alert and oriented, cooperative.  SKIN: Warm and dry.       Data:     LIPID RESULTS:  Lab Results   Component Value Date    CHOL 126 10/22/2022    HDL 31 (L) 10/22/2022    LDL 77 10/22/2022    TRIG 89 10/22/2022     LIVER ENZYME RESULTS:  Lab Results   Component Value Date    AST 33 10/28/2022    ALT 66 10/28/2022     CBC RESULTS:  Lab Results   Component Value Date    WBC 3.9 (L) 10/29/2022    RBC 4.48 10/29/2022    HGB 13.4 10/29/2022    HCT 40.4 10/29/2022    MCV 90 10/29/2022    MCH 29.9 10/29/2022    MCHC 33.2 10/29/2022    RDW 13.7 10/29/2022     10/29/2022     BMP RESULTS:  Lab Results   Component Value Date     11/29/2022    POTASSIUM 4.1 11/29/2022    POTASSIUM 4.2 10/29/2022    CHLORIDE 108 (H) 11/29/2022    CHLORIDE 107 10/29/2022    CO2 24 11/29/2022    CO2 23 10/29/2022    ANIONGAP 8 11/29/2022    ANIONGAP 9 10/29/2022    GLC 79 11/29/2022    GLC 83 10/29/2022    BUN 17.2  11/29/2022    BUN 18 10/29/2022    CR 1.35 (H) 11/29/2022    GFRESTIMATED 64 11/29/2022    CHARLIE 9.2 11/29/2022      A1C RESULTS:  Lab Results   Component Value Date    A1C 5.5 10/21/2022     INR RESULTS:  Lab Results   Component Value Date    INR 1.16 (H) 10/21/2022          Medications     Current Outpatient Medications   Medication Sig Dispense Refill     apixaban ANTICOAGULANT (ELIQUIS) 5 MG tablet Take 1 tablet (5 mg) by mouth 2 times daily 180 tablet 3     empagliflozin (JARDIANCE) 10 MG TABS tablet Take 1 tablet(10mg) by mouth every day 90 tablet 3     metoprolol succinate ER (TOPROL XL) 25 MG 24 hr tablet Take 1 tablet(25mg) by mouth twice daily 180 tablet 3     sacubitril-valsartan (ENTRESTO) 24-26 MG per tablet Take 1 tablet by mouth 2 times daily Future refills by Primary Provider or Cardiology. 60 tablet 0     sacubitril-valsartan (ENTRESTO) 49-51 MG per tablet Take 1 tablet by mouth 2 times daily 60 tablet 1          Past Medical History     Past Medical History:   Diagnosis Date     Atrial tachycardia (H)      Chronic HFrEF (heart failure with reduced ejection fraction) (H)      Left ventricular apical thrombus      Mitral regurgitation      Pneumonia due to 2019 novel coronavirus      Right ventricular apical thrombus      Past Surgical History:   Procedure Laterality Date     CV CORONARY ANGIOGRAM N/A 10/27/2022    Procedure: Coronary Angiogram;  Surgeon: Abdiel Clark MD;  Location:  HEART CARDIAC CATH LAB     Family History   Problem Relation Age of Onset     Coronary Artery Disease Maternal Grandmother             Allergies   Patient has no known allergies.    70 minutes spent on the date of the encounter doing chart review, history and exam, documentation and further activities as noted above      MIRIAM Menjivar [facility] CARE  Pager: [phone]

## 2022-12-13 NOTE — LETTER
"12/13/2022    Physician No Ref-Primary  No address on file    RE: Tom Rosario       Dear Colleague,     I had the pleasure of seeing Tom Rosario in the [facility].  Cardiology Clinic Progress Note  Tom Rosario MRN# [id number]   YOB: 1975 Age: 47 year old   Primary Cardiologist: Dr. Hendrickson  Reason for visit: CORE follow up            Assessment and Plan:   Tom Rosario is a very pleasant 47 year old male here today for CORE follow up.     1.  Severe biventricular heart failure/HFrEF, nonischemic cardiomyopathy -LVEF < 20%, LVIDd 7cm, RV systolic dysfunction severely decreased    - NYHA class II, stage C   - Etiology: nonischemic, denies alcohol/drug use, no recent infection other than COVID-19 PNA 10/2022. CMRI negative for fibrosis. TSH 5.75 with Free T4 0.95.    - Fluid status : euvolemic   - Goal weight ~ 178#   - Diuretic regimen : none    - Ischemic evaluation : coronary angiogram 10/2022, normal coronaries   - Guideline directed medical therapy    - Betablocker: INCREASE metoprolol XL to 50mg BID    - ACEI/ARB/ARNI: Entresto 49/51mg BID    - Aldactone antagonist: consider at follow up appointment    - SGLT2i: Jardiance 10mg daily    - Sudden cardiac death prophylaxis : will repeat echocardiogram after optimization of medications. If LVEF remains less than or equal to 35% will refer for ICD consideration.   2. Large LV and RV thrombi - on elqiuis  3. Atrial tachycardia   4. Mild to moderate mitral regurgitation - functional  5. Nonsustained VT - noted on event monitor    I met Louiejakub today for the first time. He is doing well from a heart failure standpoint, appears compensated and euvolemic, NYHA class II symptoms. He has noted since increase in Entresto, \"burning/mouth being sore\" after taking Entresto. Unclear etiology but seems unlikely to be related to Entresto. Advised patient to establish care with PCP to rule out other causes.  Reviewed avoiding highly " "acidic/irritating foods/drinks.     He has been noticing increased episodes of palpitations/\"feeling heart race\" for the past couple weeks, at time lasting up to 2 hrs, no accompanying symptoms. Recommended getting a 7 day zio monitor, this could also help quantify PVC burden, as frequent PVCs were noted on event monitor.     Explored further optimization of GDMT, given increased palpitations, recommended increased metoprolol XL to 50mg BID.     Referral for genetic counseling placed, he shared today that a cousin on his dads side of the family  at age 14 related to \"enlarged heart\"    Will plan to have all follow up and testing in [place] as this is where he lives.     Changes today: INCREASE metoprolol XL to 50mg BID    Follow up plan:     7 day ZIO monitor     CORE follow up with echo, labs, and 12 lead EKG prior in     Genetic consult    Needs follow up with Dr. Hendrickson     Cardiac rehab -declined    Set up appt with PCP - provided him with a number and advised to schedule follow up.         History of Presenting Illness:    Tom Rosario is a very pleasant 47 year old male with a history of chronic biventricular heart failure, nonischemic cardiomyopathy, COVID-19 PNA, atrial tachycardia, large left apical thromb and right apical thrombi and mild to moderate mitral regurgitation.     Primary cardiologist Dr. Hendrickson.     Patient was hospitalized 10/27-10/30/22 during which he was treated for pneumonia and found to have atrial tachycardia, reduced EF of < 20%, LVIDD at that time noted to be 7 cm, LV and RV thrombi, and mild functional MR. Coronary angiogram was negative. GDMT was initiated with BB and ARB, she did not require diuretics at that time. Cardiac MRI 10/27/22 was consistent with NICM with two areas of late gadolinium enhancement consistent with probable embolic infarcts, large left apical thrombus, right apical thrombus, LV EF 16%, and RV EF 23%.    Event monitor placed at time of hospital " "discharge showed PVCs, NSVT and possibly brief less than 10 second episodes of atrial fib/flutter.     Patient met with MIRIAM Wood CNP for CORE enrollment in November at which time his Entresto was increased to 49/51mg BID.     Patient is here today for CORE follow up.     Patient reports feeling good. Biggest complaint is mouth feeling sore after taking entresto. Monitoring weights daily a home. Typically 178-179#. Denies shortness of breath at rest. Some shortness of breath with the cold. Has noted some exertional dyspnea with the stairs the past 1-2 weeks. Has needed to started using the elevator at work. Okay with 1 flight, but > 1 flight difficult. Denies orthopnea or PND.   Denies chest pain or chest tightness. Denies dizziness, lightheadedness or other presyncopal symptoms. The past 1-2 weeks has been having episodes of palpitations feeling heart racing, could last up to 2hrs, no other accompanying symptoms. Taking medications daily.     No labs today, but labs from end of November showed overall stable renal function and electrolytes. Blood pressure 107/73 and HR 86 in clinic today.    Appetite good. Eating meals at home. No set exercise routine, walking for exercise. Denies alcohol or tobacco use. Shared that his mother  of heart attack but was being treated for cancer and states cancer is why she . Had cousin his paternal side  at age 14 year from \"enlarged heart\".         Social History    Lives with significant other in [place], 7 children, 9 year gold daughter, other children grown. Works for [email or web address].    Social History     Socioeconomic History     Marital status: Single     Spouse name: Not on file     Number of children: Not on file     Years of education: Not on file     Highest education level: Not on file   Occupational History     Not on file   Tobacco Use     Smoking status: Former     Packs/day: 0.50     Years: 10.00     Pack years: 5.00     Types: Cigarettes     Smokeless " tobacco: Never   Substance and Sexual Activity     Alcohol use: Not Currently     Drug use: Not Currently     Sexual activity: Not on file   Other Topics Concern     Not on file   Social History Narrative     Not on file     Social Determinants of Health     Financial Resource Strain: Not on file   Food Insecurity: Not on file   Transportation Needs: Not on file   Physical Activity: Not on file   Stress: Not on file   Social Connections: Not on file   Intimate Partner Violence: Not on file   Housing Stability: Not on file            Review of Systems:   10 point ROS neg other than the symptoms noted above in the HPI.         Physical Exam:   Vitals: There were no vitals taken for this visit.   Wt Readings from Last 4 Encounters:   11/14/22 81.1 kg (178 lb 12.8 oz)   10/30/22 72.4 kg (159 lb 11.2 oz)   10/27/22 77.8 kg (171 lb 9.6 oz)     GEN: well nourished, in no acute distress.  HEENT:  Pupils equal, round. Sclerae nonicteric.   NECK: Supple, no masses appreciated. JVP appears normal.  C/V:  Regular rate and rhythm, no murmur, rub or gallop.    RESP: Respirations are unlabored. Clear to auscultation bilaterally without wheezing, rales, or rhonchi.  GI: Abdomen soft, nontender.  EXTREM: No LE edema.  NEURO: Alert and oriented, cooperative.  SKIN: Warm and dry.       Data:     LIPID RESULTS:  Lab Results   Component Value Date    CHOL 126 10/22/2022    HDL 31 (L) 10/22/2022    LDL 77 10/22/2022    TRIG 89 10/22/2022     LIVER ENZYME RESULTS:  Lab Results   Component Value Date    AST 33 10/28/2022    ALT 66 10/28/2022     CBC RESULTS:  Lab Results   Component Value Date    WBC 3.9 (L) 10/29/2022    RBC 4.48 10/29/2022    HGB 13.4 10/29/2022    HCT 40.4 10/29/2022    MCV 90 10/29/2022    MCH 29.9 10/29/2022    MCHC 33.2 10/29/2022    RDW 13.7 10/29/2022     10/29/2022     BMP RESULTS:  Lab Results   Component Value Date     11/29/2022    POTASSIUM 4.1 11/29/2022    POTASSIUM 4.2 10/29/2022    CHLORIDE  108 (H) 11/29/2022    CHLORIDE 107 10/29/2022    CO2 24 11/29/2022    CO2 23 10/29/2022    ANIONGAP 8 11/29/2022    ANIONGAP 9 10/29/2022    GLC 79 11/29/2022    GLC 83 10/29/2022    BUN 17.2 11/29/2022    BUN 18 10/29/2022    CR 1.35 (H) 11/29/2022    GFRESTIMATED 64 11/29/2022    CHARLIE 9.2 11/29/2022      A1C RESULTS:  Lab Results   Component Value Date    A1C 5.5 10/21/2022     INR RESULTS:  Lab Results   Component Value Date    INR 1.16 (H) 10/21/2022          Medications     Current Outpatient Medications   Medication Sig Dispense Refill     apixaban ANTICOAGULANT (ELIQUIS) 5 MG tablet Take 1 tablet (5 mg) by mouth 2 times daily 180 tablet 3     empagliflozin (JARDIANCE) 10 MG TABS tablet Take 1 tablet(10mg) by mouth every day 90 tablet 3     metoprolol succinate ER (TOPROL XL) 25 MG 24 hr tablet Take 1 tablet(25mg) by mouth twice daily 180 tablet 3     sacubitril-valsartan (ENTRESTO) 24-26 MG per tablet Take 1 tablet by mouth 2 times daily Future refills by Primary Provider or Cardiology. 60 tablet 0     sacubitril-valsartan (ENTRESTO) 49-51 MG per tablet Take 1 tablet by mouth 2 times daily 60 tablet 1          Past Medical History     Past Medical History:   Diagnosis Date     Atrial tachycardia (H)      Chronic HFrEF (heart failure with reduced ejection fraction) (H)      Left ventricular apical thrombus      Mitral regurgitation      Pneumonia due to 2019 novel coronavirus      Right ventricular apical thrombus      Past Surgical History:   Procedure Laterality Date     CV CORONARY ANGIOGRAM N/A 10/27/2022    Procedure: Coronary Angiogram;  Surgeon: Abdiel Clark MD;  Location: [facility]     Family History   Problem Relation Age of Onset     Coronary Artery Disease Maternal Grandmother             Allergies   Patient has no known allergies.    70 minutes spent on the date of the encounter doing chart review, history and exam, documentation and further activities as noted  above      MIRIAM Menjivar CNP  [facility]  Pager: [phone]      Thank you for allowing me to participate in the care of your patient.      Sincerely,     MIRIAM Menjivar CNP     [facility]  cc:   MIRIAM Black CNP  [address] [place] [address]

## 2022-12-13 NOTE — PATIENT INSTRUCTIONS
INCREASE metoprolol 50mg 2 x day  Current prescription 50mg = 2 tablets 2x a day  New prescription 50mg = 1 tablet 2x a day  2. Get heart monitor placed  3. Schedule genetic counseling appt  4. Get echocardiogram  5. Follow up with Hollie in 1 month with labs prior  6. Please call with any questions/concerns [phone]  7. Schedule primary care appt [phone]

## 2022-12-19 NOTE — TELEPHONE ENCOUNTER
Health Call Center    Phone Message    May a detailed message be left on voicemail: no     Reason for Call: Other: Josiah called from [facility] case management to provide her contact information. She can be reached at [phone] EXT# [id number]. Please pass this information along to the Pt at your convenience.      Action Taken: Other: [facility]    Travel Screening: Not Applicable

## 2022-12-19 NOTE — TELEPHONE ENCOUNTER
[facility]    Info added to cardiology snapshot.    Karley Burrell RN BSN   2:04 PM 12/19/22  CORE nurse line M-F 8a-4p: [phone]

## 2022-12-29 NOTE — TELEPHONE ENCOUNTER
MAX ATTEMPTS to reach out to pt to schedule GC with Colleen Diego.No answer, LVM   If pt wishes to be scheduled please call [phone]

## 2023-02-12 ENCOUNTER — HEALTH MAINTENANCE LETTER (OUTPATIENT)
Age: 48
End: 2023-02-12

## 2023-02-13 ENCOUNTER — MYC MEDICAL ADVICE (OUTPATIENT)
Dept: CARDIOLOGY | Facility: CLINIC | Age: 48
End: 2023-02-13
Payer: COMMERCIAL

## 2023-02-13 NOTE — TELEPHONE ENCOUNTER
[facility] C[facility]    -Spoke with Danilo who states Tom passed away on 1/30/23 in his sleep. She wanted us to be aware that an autopsy was performed and they removed his heart and sent to pathology due to it being enlarged.      [facility] notified of pt death.     Clotilde Holbrook RN BAN   3:14 PM 2/13/2023    CORE nurse line M-F 8a-4p: [phone]

## (undated) DEVICE — INTRO GLIDESHEATH SLENDER 6FR 10X45CM 60-1060

## (undated) DEVICE — WIRE GUIDE 0.035"X260CM SAFE-T-J EXCHANGE G00517

## (undated) DEVICE — MANIFOLD KIT ANGIO AUTOMATED 014613

## (undated) DEVICE — SLEEVE TR BAND RADIAL COMPRESSION DEVICE 24CM TRB24-REG

## (undated) DEVICE — DEFIB PRO-PADZ LVP LQD GEL ADULT 8900-2105-01

## (undated) DEVICE — Device

## (undated) DEVICE — CATH JACKY 5FR 3.5 CURVE 40-5023